# Patient Record
Sex: MALE | Race: WHITE | NOT HISPANIC OR LATINO | Employment: FULL TIME | URBAN - METROPOLITAN AREA
[De-identification: names, ages, dates, MRNs, and addresses within clinical notes are randomized per-mention and may not be internally consistent; named-entity substitution may affect disease eponyms.]

---

## 2023-02-27 ENCOUNTER — TRANSFERRED RECORDS (OUTPATIENT)
Dept: HEALTH INFORMATION MANAGEMENT | Facility: CLINIC | Age: 58
End: 2023-02-27

## 2023-02-28 ENCOUNTER — TRANSFERRED RECORDS (OUTPATIENT)
Dept: HEALTH INFORMATION MANAGEMENT | Facility: CLINIC | Age: 58
End: 2023-02-28

## 2023-03-02 ENCOUNTER — TRANSFERRED RECORDS (OUTPATIENT)
Dept: HEALTH INFORMATION MANAGEMENT | Facility: CLINIC | Age: 58
End: 2023-03-02

## 2023-03-02 ENCOUNTER — MEDICAL CORRESPONDENCE (OUTPATIENT)
Dept: HEALTH INFORMATION MANAGEMENT | Facility: CLINIC | Age: 58
End: 2023-03-02

## 2023-03-03 ENCOUNTER — MEDICAL CORRESPONDENCE (OUTPATIENT)
Dept: HEALTH INFORMATION MANAGEMENT | Facility: CLINIC | Age: 58
End: 2023-03-03

## 2023-03-03 ENCOUNTER — TRANSCRIBE ORDERS (OUTPATIENT)
Dept: OTHER | Age: 58
End: 2023-03-03

## 2023-03-03 ENCOUNTER — TELEPHONE (OUTPATIENT)
Dept: GASTROENTEROLOGY | Facility: CLINIC | Age: 58
End: 2023-03-03
Payer: COMMERCIAL

## 2023-03-03 ENCOUNTER — PATIENT OUTREACH (OUTPATIENT)
Dept: GASTROENTEROLOGY | Facility: CLINIC | Age: 58
End: 2023-03-03

## 2023-03-03 DIAGNOSIS — C25.0 ADENOCARCINOMA OF HEAD OF PANCREAS (H): Primary | ICD-10-CM

## 2023-03-03 NOTE — TELEPHONE ENCOUNTER
"Advanced Endoscopy     Referring provider: Dr Hayden Gallardo @ [facility]  Phone: [phone], Fax: [phone]    Referred to: Advanced Endoscopy Provider Group     Provider Requested: none specified     Referral Received: 3/3/23     Records received: CE    Labs 3/2/23  Total Bili 8.8  AST 71    Alk Phos 233    ERCP 3/2/23    Impression:          - ampulla with no bile flow         distal CBD was selectively cannulated with a sphincterotome preloaded with an         0.035\" guidewire, but guidewire could not be further advanced         distal CBD was selectively cannulated with a sphincterotome preloaded with an         0.025\" guidewire, but guidewire could not be further advanced even with some         contrast injection that did not inject into more proximal bile ducts         pre-cut sphincterotomy was performed in usual and guidewire was advanced only             into proximal CBD and could not be advanced further with guidewire rebounding             from proximal CBD obstruction         distal CBD was selectively cannulated with a sphincterotome preloaded with an         0.025\" guidewire, but again guidewire could not be further advanced     Recommendation:          - 1) Indomethacin 50 mg two suppositories OH for PEP prophylaxis         2) avoid NSAIDs (Advil, Motrin, Aleve, Naprosyn, Mobic, etc.) for 5 days         3) monitor for PEP         4) monitor LFTs         5) f/u Medical Oncology         6) referral to tertiary center for additional biliary drainage procedures         7) obtain CT pancreatic tumor protocol with IV contrast for further evaluation         8) f/u pathology results - pending    EGD/ERCP 2/28/23  Impression:          - distal CBD was selectively cannulated with a sphinctertome preloaded with an             0.035\" guidewire, but guidewire could not be further advanced         distal CBD was selectively cannulated with a sphinctertome preloaded with an         0.025\" guidewire, however " guidewire was only advanced into proximal CBD and         could not be advanced further with guidewire rebounding from proximal CBD         obstruction     FINAL DIAGNOSIS     Pancreas, endoscopic ultrasound-guided fine-needle aspirate:               - Positive for malignancy, adenocarcinoma.     MRCP 2/28/23  IMPRESSION:   1.  Ill-defined and malignant-appearing mass in the head of the pancreas measuring potentially up to 4.6 cm in diameter. This lesion results in abrupt obstruction of the common bile duct as well as pancreatic duct and is strongly concerning for primary pancreatic neoplasm. The relationship of this lesion to the surrounding vascular structures is not well-defined on this noncontrast study. Recommend multiphasic contrast-enhanced CT of the abdomen using the pancreatic protocol for more accurate characterization and staging of this lesion.     2.  This lesion results in obstruction of the biliary system with an abrupt cut off the common bile duct and mild diffuse intrahepatic biliary dilatation.     3.  Gallbladder is also distended and contains some stone and/or sludge material.       Images received: records dept obtaining    Evaluation for: Adenocarcinoma of head of pancreas (H)possible resection of pancreatic adenocarcinoma (confirmed by [facility] from EHS done this wek) possible biliary drainage w/ ERCP or other procedures     Clinical History (per RN review):     Hospital admission 2/27-3/2/23  57-year-old presented with painless jaundice, significant weight loss  CT describing an ill-defined of up to 4.6 cm  Concern for pancreatic malignancy Causing obstructive icterus.  ERCP done on 28 February and 2 March, Dr. Gallardo could not get through the obstruction  Refer to tertiary center for repeat attempt    MD review date:   MD Decision for clinic consultation/Orders:            Referral updates/Patient contacted:

## 2023-03-06 ENCOUNTER — PREP FOR PROCEDURE (OUTPATIENT)
Dept: GASTROENTEROLOGY | Facility: CLINIC | Age: 58
End: 2023-03-06
Payer: COMMERCIAL

## 2023-03-06 ENCOUNTER — PRE VISIT (OUTPATIENT)
Dept: ONCOLOGY | Facility: CLINIC | Age: 58
End: 2023-03-06
Payer: COMMERCIAL

## 2023-03-06 ENCOUNTER — PATIENT OUTREACH (OUTPATIENT)
Dept: ONCOLOGY | Facility: CLINIC | Age: 58
End: 2023-03-06
Payer: COMMERCIAL

## 2023-03-06 DIAGNOSIS — K83.1 BILIARY OBSTRUCTION (H): Primary | ICD-10-CM

## 2023-03-06 DIAGNOSIS — C25.9 PANCREAS CANCER (H): Primary | ICD-10-CM

## 2023-03-06 NOTE — PROGRESS NOTES
New Patient Oncology Nurse Navigator Note     Referring provider: Dr. Gallardo     Referring Clinic/Organization: [facility]     Referred to: Surgical Oncology -  Hepatobiliary / GI Cancers     Requested provider (if applicable): First available provider    Referral Received: 03/06/23       Evaluation for : Pancreas Cancer      Clinical History (per Nurse review of records provided):      See book marked documents:       Referring MD office note    Pathology report    Imaging reports     Procedure reports     Clinical Assessment / Barriers to Care (Per Nurse):    None at this time.     Records Location:     [facility] Everywhere     Records Needed:     ABDOMINAL IMAGING (CT SCANS, MRI, US, PET SCANS)  DATING BACK TO 2018      Additional testing needed prior to consult:     CT CAP   EUS/ERCP  LABS    Referral updates and Plan:       Consult with Surgical Oncology   CT CAP - pancreas protocol  Labs   EUS/ERCP        Natty Uribe RN, BSN   Surgical Oncology New Patient Nurse Navigator  [facility]  [phone]

## 2023-03-06 NOTE — PROGRESS NOTES
RECORDS STATUS - ALL OTHER DIAGNOSIS    Adenocarcinoma of head of pancreas (H)  RECORDS RECEIVED FROM: Epic/ [facility]    DATE RECEIVED: TBD    Action    Action Taken 3/6/2023 3pm KEELIE   I called [facility]- they will push imaging to [facility] PACS    3/7/2023 9:56AM BRYAN   I resolved imaging in PACS    3/8/2023 9:16AM BRYAN Vale said path slides are not needed.       NOTES STATUS DETAILS   OFFICE NOTE from referring provider     OFFICE NOTE from medical oncologist Complete [facility] Records are in CE   DISCHARGE SUMMARY from hospital     DISCHARGE REPORT from the ER     OPERATIVE REPORT Complete See biopsy in CE   CLINICAL TRIAL TREATMENTS TO DATE     LABS     PATHOLOGY REPORTS External biopsy - [facility]    Per BRY Vale's in-basket on 3/8/2023 path slides aren't needed  2/28/2023 [facility] Biopsy   Case: [id number]  Pancreas, endoscopic ultrasound-guided fine-needle aspirate:               - Positive for malignancy, adenocarcinoma.   ANYTHING RELATED TO DIAGNOSIS COmplete Labs last updated on 3/6/2023   GENONOMIC TESTING     TYPE:     IMAGING (NEED IMAGES & REPORT)     CT SCANS Complete - [facility]  CT Abdomen 1/24/2022   Xray Abdomen  Complete- [facility]  Xray Abdomen 1/24/2022   MRI Complete - [facility]  MRI Abdomen 2/28/2023   MAMMO     ULTRASOUND Complete- [facility] US Abdomen Complete 1/26/2022   PET

## 2023-03-06 NOTE — TELEPHONE ENCOUNTER
Per Dr. Abel Rizo for ERCP    Hospital admission 2/27-3/2/23  57-year-old presented with painless jaundice, significant weight loss  CT describing an ill-defined of up to 4.6 cm  Concern for pancreatic malignancy Causing obstructive icterus.  ERCP done on 28 February and 2 March, Dr. Gallardo could not get through the obstruction  Refer to tertiary center for repeat attempt    Specimen:  Fine Needle Aspiration - Pancreatic structure (body structure)  Component 6 d ago   Clinical History  Pancreatic mass    FINAL DIAGNOSIS     Pancreas, endoscopic ultrasound-guided fine-needle aspirate:               - Positive for malignancy, adenocarcinoma.       Patient is 6.5 hr away, wants to consolidate cares if needed. Message sent to surg onc, pt will follow locally with med oncology.     ML

## 2023-03-07 ENCOUNTER — PATIENT OUTREACH (OUTPATIENT)
Dept: GASTROENTEROLOGY | Facility: CLINIC | Age: 58
End: 2023-03-07
Payer: COMMERCIAL

## 2023-03-07 NOTE — TELEPHONE ENCOUNTER
Called patient to discuss plan for ERCP with Dr. Roman on 3/14, probable oncology visit prior.   Left message    ML

## 2023-03-08 NOTE — TELEPHONE ENCOUNTER
Called patient to confirm plan  For oncology visits on Monday, ERCP Tuesday, patient knows plan.    Odilia Acharya, RN Care Coordinator

## 2023-03-09 ENCOUNTER — PATIENT OUTREACH (OUTPATIENT)
Dept: GASTROENTEROLOGY | Facility: CLINIC | Age: 58
End: 2023-03-09
Payer: COMMERCIAL

## 2023-03-13 ENCOUNTER — PATIENT OUTREACH (OUTPATIENT)
Dept: GASTROENTEROLOGY | Facility: CLINIC | Age: 58
End: 2023-03-13

## 2023-03-13 RX ORDER — CHOLESTYRAMINE 4 G/9G
4 POWDER, FOR SUSPENSION ORAL 2 TIMES DAILY
COMMUNITY
Start: 2023-02-27 | End: 2023-03-20

## 2023-03-13 RX ORDER — SILDENAFIL CITRATE 20 MG/1
20 TABLET ORAL
COMMUNITY

## 2023-03-13 RX ORDER — METOPROLOL SUCCINATE 25 MG/1
1 TABLET, EXTENDED RELEASE ORAL DAILY
COMMUNITY
Start: 2023-02-28

## 2023-03-13 RX ORDER — IBUPROFEN 200 MG
800 TABLET ORAL EVERY 6 HOURS PRN
COMMUNITY
End: 2023-03-20

## 2023-03-13 RX ORDER — SEMAGLUTIDE 1.34 MG/ML
0.5 INJECTION, SOLUTION SUBCUTANEOUS WEEKLY
COMMUNITY
Start: 2023-02-23

## 2023-03-13 RX ORDER — ALLOPURINOL 100 MG/1
1 TABLET ORAL DAILY
COMMUNITY
Start: 2023-02-16 | End: 2024-02-16

## 2023-03-13 RX ORDER — GLIPIZIDE 10 MG/1
10 TABLET ORAL
COMMUNITY
Start: 2022-11-15 | End: 2023-11-15

## 2023-03-13 RX ORDER — OXYCODONE HYDROCHLORIDE 5 MG/1
5-10 TABLET ORAL EVERY 6 HOURS PRN
COMMUNITY
Start: 2023-03-06 | End: 2023-03-14

## 2023-03-13 RX ORDER — AMLODIPINE BESYLATE 5 MG/1
5 TABLET ORAL DAILY
COMMUNITY
Start: 2023-03-03 | End: 2023-03-20

## 2023-03-13 RX ORDER — HYDROXYZINE HYDROCHLORIDE 10 MG/1
10 TABLET, FILM COATED ORAL 3 TIMES DAILY PRN
COMMUNITY

## 2023-03-13 NOTE — TELEPHONE ENCOUNTER
Called patient to confirm plan for procedure scheduled tomorrow, patient confirmed they will arrive for procedure tomorrow, discussed NPO guidelines, questions answered.  ML   Encounter Date: 5/4/2017    SCRIBE #1 NOTE: I, Raven Oliva, am scribing for, and in the presence of, Dr. Ribera.       History     Chief Complaint   Patient presents with    Back Pain     Review of patient's allergies indicates:   Allergen Reactions    Ceclor [cefaclor] Hives     HPI Comments: Time seen by provider: 9:14 PM    This is a 41 y.o. male with history of herniated lumbar disc, DM, s/p appendectomy who presents with complaint of acute onset of low back pain. Symptoms started this morning after he coughed. Reports history of sciatica causing low back pain. He has received epidural steroid injections for the pain in the past. He has been using CBD oil for treatment with success until today. Last episode of severe pain was 3 year ago. Also complains of mild chest tightness at night and intermittent productive cough. He denies fever, chills, urinary symptoms, hematuria, weakness of the extremities. He takes insulin injections and denies redness surrounding the injection site. States that this blood sugars have been good recently.     The history is provided by the patient.     Past Medical History:   Diagnosis Date    Diabetes mellitus type II     Fever blister     Herniated lumbar intervertebral disc 2011     Past Surgical History:   Procedure Laterality Date    APPENDECTOMY       Family History   Problem Relation Age of Onset    Diabetes Mother     Hypertension Father     Diabetes Maternal Aunt     Diabetes Maternal Grandmother      Social History   Substance Use Topics    Smoking status: Current Every Day Smoker     Packs/day: 1.00    Smokeless tobacco: None    Alcohol use No     Review of Systems   Constitutional: Negative for chills and fever.   Respiratory: Positive for cough and chest tightness.    Genitourinary: Negative for difficulty urinating, enuresis, frequency and hematuria.   Musculoskeletal: Positive for back pain.   Skin: Negative for color change.   Neurological: Negative for  weakness.       Physical Exam   Initial Vitals   BP Pulse Resp Temp SpO2   05/04/17 1839 05/04/17 1839 05/04/17 1839 05/04/17 1839 05/04/17 1839   130/72 68 16 98.1 °F (36.7 °C) 98 %     Physical Exam    Nursing note and vitals reviewed.  Constitutional: No distress.   Neck: Normal range of motion. Neck supple.   Cardiovascular: Normal rate, regular rhythm and normal heart sounds.   No murmur heard.  Normal peripheral vascular with normal pedal pulses.   Pulmonary/Chest: Breath sounds normal. No respiratory distress. He has no wheezes. He has no rhonchi. He has no rales.   Abdominal: Soft. He exhibits no distension. There is no tenderness.   Musculoskeletal:        Lumbar back: He exhibits tenderness.   Lumbar spine: Mild tenderness at L5/S1 area. No evidence of deformity, erythema, or infection. Straight leg raise reproduces pain bilaterally.    Neurological: He is alert and oriented to person, place, and time.   Lower extremity strength is preserved.   No significant sensory deficits.  No focal deficits.         ED Course   Procedures  Labs Reviewed - No data to display          Medical Decision Making:   History:   Old Medical Records: I decided to obtain old medical records.  ED Management:  Patient with history of recurrent sciatica presents for a recent flare brought on by coughing.  He has no red flags suggestive of cauda equina syndrome or acute discitis.  He is given prescriptions.  Prescriptions for an oral opiates, and muscle relaxers for symptom control and is encouraged to follow-up with the orthopedic spine center if he finds an increase in acute flares.  We discussed a short course of steroids, but decided to defer this and favor more conservative treatment to the patient's history of type 1 diabetes            Scribe Attestation:   Scribe #1: I performed the above scribed service and the documentation accurately describes the services I performed. I attest to the accuracy of the note.    Attending  Attestation:           Physician Attestation for Scribe:  Physician Attestation Statement for Scribe #1: I, Dr. Ribera, reviewed documentation, as scribed by Raven Oliva in my presence, and it is both accurate and complete.                 ED Course     Clinical Impression:   The encounter diagnosis was Acute left-sided low back pain with left-sided sciatica.    Disposition:   Disposition: Discharged  Condition: Stable       Giuseppe Ribera MD  05/05/17 0019

## 2023-03-14 ENCOUNTER — HOSPITAL ENCOUNTER (OUTPATIENT)
Dept: CT IMAGING | Facility: CLINIC | Age: 58
Discharge: HOME | End: 2023-03-14
Attending: SURGERY | Admitting: INTERNAL MEDICINE
Payer: COMMERCIAL

## 2023-03-14 ENCOUNTER — ANESTHESIA EVENT (OUTPATIENT)
Dept: SURGERY | Facility: CLINIC | Age: 58
End: 2023-03-14
Payer: COMMERCIAL

## 2023-03-14 ENCOUNTER — ANESTHESIA (OUTPATIENT)
Dept: SURGERY | Facility: CLINIC | Age: 58
End: 2023-03-14
Payer: COMMERCIAL

## 2023-03-14 ENCOUNTER — HOSPITAL ENCOUNTER (OUTPATIENT)
Facility: CLINIC | Age: 58
Discharge: HOME | End: 2023-03-14
Attending: INTERNAL MEDICINE | Admitting: INTERNAL MEDICINE
Payer: COMMERCIAL

## 2023-03-14 ENCOUNTER — APPOINTMENT (OUTPATIENT)
Dept: GENERAL RADIOLOGY | Facility: CLINIC | Age: 58
End: 2023-03-14
Attending: INTERNAL MEDICINE
Payer: COMMERCIAL

## 2023-03-14 VITALS
BODY MASS INDEX: 31.42 KG/M2 | OXYGEN SATURATION: 100 % | SYSTOLIC BLOOD PRESSURE: 159 MMHG | TEMPERATURE: 98.6 F | DIASTOLIC BLOOD PRESSURE: 81 MMHG | RESPIRATION RATE: 16 BRPM | HEART RATE: 79 BPM | HEIGHT: 71 IN | WEIGHT: 224.43 LBS

## 2023-03-14 DIAGNOSIS — G89.18 ACUTE POST-OPERATIVE PAIN: Primary | ICD-10-CM

## 2023-03-14 DIAGNOSIS — C25.9 PANCREAS CANCER (H): ICD-10-CM

## 2023-03-14 LAB
ALBUMIN SERPL BCG-MCNC: 4.2 G/DL (ref 3.5–5.2)
ALP SERPL-CCNC: 390 U/L (ref 40–129)
ALT SERPL W P-5'-P-CCNC: 132 U/L (ref 10–50)
AMYLASE SERPL-CCNC: 44 U/L (ref 28–100)
ANION GAP SERPL CALCULATED.3IONS-SCNC: 13 MMOL/L (ref 7–15)
AST SERPL W P-5'-P-CCNC: 74 U/L (ref 10–50)
BILIRUB SERPL-MCNC: 14.7 MG/DL
BUN SERPL-MCNC: 15.8 MG/DL (ref 6–20)
CALCIUM SERPL-MCNC: 9.8 MG/DL (ref 8.6–10)
CHLORIDE SERPL-SCNC: 101 MMOL/L (ref 98–107)
CREAT SERPL-MCNC: 0.77 MG/DL (ref 0.67–1.17)
DEPRECATED HCO3 PLAS-SCNC: 21 MMOL/L (ref 22–29)
ERYTHROCYTE [DISTWIDTH] IN BLOOD BY AUTOMATED COUNT: 14.3 % (ref 10–15)
GFR SERPL CREATININE-BSD FRML MDRD: >90 ML/MIN/1.73M2
GLUCOSE BLDC GLUCOMTR-MCNC: 269 MG/DL (ref 70–99)
GLUCOSE BLDC GLUCOMTR-MCNC: 292 MG/DL (ref 70–99)
GLUCOSE BLDC GLUCOMTR-MCNC: 332 MG/DL (ref 70–99)
GLUCOSE BLDC GLUCOMTR-MCNC: 364 MG/DL (ref 70–99)
GLUCOSE SERPL-MCNC: 288 MG/DL (ref 70–99)
HBA1C MFR BLD: 8.1 %
HCT VFR BLD AUTO: 43 % (ref 40–53)
HGB BLD-MCNC: 13.8 G/DL (ref 13.3–17.7)
INR PPP: 1.36 (ref 0.85–1.15)
LIPASE SERPL-CCNC: 102 U/L (ref 13–60)
MCH RBC QN AUTO: 29.4 PG (ref 26.5–33)
MCHC RBC AUTO-ENTMCNC: 32.1 G/DL (ref 31.5–36.5)
MCV RBC AUTO: 92 FL (ref 78–100)
PLATELET # BLD AUTO: 252 10E3/UL (ref 150–450)
POTASSIUM SERPL-SCNC: 4.3 MMOL/L (ref 3.4–5.3)
PROT SERPL-MCNC: 7.6 G/DL (ref 6.4–8.3)
RBC # BLD AUTO: 4.69 10E6/UL (ref 4.4–5.9)
SODIUM SERPL-SCNC: 135 MMOL/L (ref 136–145)
WBC # BLD AUTO: 6.7 10E3/UL (ref 4–11)

## 2023-03-14 PROCEDURE — 258N000003 HC RX IP 258 OP 636: Performed by: NURSE ANESTHETIST, CERTIFIED REGISTERED

## 2023-03-14 PROCEDURE — 999N000181 XR SURGERY CARM FLUORO GREATER THAN 5 MIN W STILLS: Mod: TC

## 2023-03-14 PROCEDURE — 250N000011 HC RX IP 250 OP 636: Performed by: ANESTHESIOLOGY

## 2023-03-14 PROCEDURE — 999N000141 HC STATISTIC PRE-PROCEDURE NURSING ASSESSMENT: Performed by: INTERNAL MEDICINE

## 2023-03-14 PROCEDURE — C1769 GUIDE WIRE: HCPCS | Performed by: INTERNAL MEDICINE

## 2023-03-14 PROCEDURE — 36415 COLL VENOUS BLD VENIPUNCTURE: CPT | Performed by: INTERNAL MEDICINE

## 2023-03-14 PROCEDURE — 272N000001 HC OR GENERAL SUPPLY STERILE: Performed by: INTERNAL MEDICINE

## 2023-03-14 PROCEDURE — 85027 COMPLETE CBC AUTOMATED: CPT | Performed by: INTERNAL MEDICINE

## 2023-03-14 PROCEDURE — 250N000013 HC RX MED GY IP 250 OP 250 PS 637: Performed by: INTERNAL MEDICINE

## 2023-03-14 PROCEDURE — 74178 CT ABD&PLV WO CNTR FLWD CNTR: CPT | Mod: 26 | Performed by: RADIOLOGY

## 2023-03-14 PROCEDURE — 710N000009 HC RECOVERY PHASE 1, LEVEL 1, PER MIN: Performed by: INTERNAL MEDICINE

## 2023-03-14 PROCEDURE — 85610 PROTHROMBIN TIME: CPT | Performed by: INTERNAL MEDICINE

## 2023-03-14 PROCEDURE — 71260 CT THORAX DX C+: CPT

## 2023-03-14 PROCEDURE — 82150 ASSAY OF AMYLASE: CPT | Performed by: INTERNAL MEDICINE

## 2023-03-14 PROCEDURE — 250N000011 HC RX IP 250 OP 636: Performed by: INTERNAL MEDICINE

## 2023-03-14 PROCEDURE — 250N000011 HC RX IP 250 OP 636: Performed by: SURGERY

## 2023-03-14 PROCEDURE — 82962 GLUCOSE BLOOD TEST: CPT | Mod: 91

## 2023-03-14 PROCEDURE — 250N000011 HC RX IP 250 OP 636: Performed by: STUDENT IN AN ORGANIZED HEALTH CARE EDUCATION/TRAINING PROGRAM

## 2023-03-14 PROCEDURE — 71260 CT THORAX DX C+: CPT | Mod: 26 | Performed by: RADIOLOGY

## 2023-03-14 PROCEDURE — 360N000082 HC SURGERY LEVEL 2 W/ FLUORO, PER MIN: Performed by: INTERNAL MEDICINE

## 2023-03-14 PROCEDURE — 255N000002 HC RX 255 OP 636: Performed by: INTERNAL MEDICINE

## 2023-03-14 PROCEDURE — 250N000011 HC RX IP 250 OP 636

## 2023-03-14 PROCEDURE — 370N000017 HC ANESTHESIA TECHNICAL FEE, PER MIN: Performed by: INTERNAL MEDICINE

## 2023-03-14 PROCEDURE — 250N000009 HC RX 250: Performed by: NURSE ANESTHETIST, CERTIFIED REGISTERED

## 2023-03-14 PROCEDURE — 250N000011 HC RX IP 250 OP 636: Performed by: NURSE ANESTHETIST, CERTIFIED REGISTERED

## 2023-03-14 PROCEDURE — 250N000009 HC RX 250: Performed by: INTERNAL MEDICINE

## 2023-03-14 PROCEDURE — 258N000003 HC RX IP 258 OP 636: Performed by: INTERNAL MEDICINE

## 2023-03-14 PROCEDURE — 710N000012 HC RECOVERY PHASE 2, PER MINUTE: Performed by: INTERNAL MEDICINE

## 2023-03-14 PROCEDURE — 74178 CT ABD&PLV WO CNTR FLWD CNTR: CPT

## 2023-03-14 PROCEDURE — C1876 STENT, NON-COA/NON-COV W/DEL: HCPCS | Performed by: INTERNAL MEDICINE

## 2023-03-14 PROCEDURE — 36415 COLL VENOUS BLD VENIPUNCTURE: CPT | Performed by: ANESTHESIOLOGY

## 2023-03-14 PROCEDURE — 250N000009 HC RX 250: Performed by: ANESTHESIOLOGY

## 2023-03-14 PROCEDURE — 83036 HEMOGLOBIN GLYCOSYLATED A1C: CPT | Performed by: ANESTHESIOLOGY

## 2023-03-14 PROCEDURE — 250N000025 HC SEVOFLURANE, PER MIN: Performed by: INTERNAL MEDICINE

## 2023-03-14 PROCEDURE — 80053 COMPREHEN METABOLIC PANEL: CPT | Performed by: INTERNAL MEDICINE

## 2023-03-14 PROCEDURE — 83690 ASSAY OF LIPASE: CPT | Performed by: INTERNAL MEDICINE

## 2023-03-14 DEVICE — STENT BILIARY LUMINEXX 10X80MMX190CM: Type: IMPLANTABLE DEVICE | Site: BILE DUCT | Status: FUNCTIONAL

## 2023-03-14 RX ORDER — INDOMETHACIN 50 MG/1
SUPPOSITORY RECTAL PRN
Status: DISCONTINUED | OUTPATIENT
Start: 2023-03-14 | End: 2023-03-14 | Stop reason: HOSPADM

## 2023-03-14 RX ORDER — LIDOCAINE 40 MG/G
CREAM TOPICAL
Status: DISCONTINUED | OUTPATIENT
Start: 2023-03-14 | End: 2023-03-14 | Stop reason: HOSPADM

## 2023-03-14 RX ORDER — PROPOFOL 10 MG/ML
INJECTION, EMULSION INTRAVENOUS PRN
Status: DISCONTINUED | OUTPATIENT
Start: 2023-03-14 | End: 2023-03-14

## 2023-03-14 RX ORDER — LIDOCAINE HYDROCHLORIDE 20 MG/ML
INJECTION, SOLUTION INFILTRATION; PERINEURAL PRN
Status: DISCONTINUED | OUTPATIENT
Start: 2023-03-14 | End: 2023-03-14

## 2023-03-14 RX ORDER — IOPAMIDOL 510 MG/ML
INJECTION, SOLUTION INTRAVASCULAR PRN
Status: DISCONTINUED | OUTPATIENT
Start: 2023-03-14 | End: 2023-03-14 | Stop reason: HOSPADM

## 2023-03-14 RX ORDER — NALOXONE HYDROCHLORIDE 0.4 MG/ML
0.4 INJECTION, SOLUTION INTRAMUSCULAR; INTRAVENOUS; SUBCUTANEOUS
Status: DISCONTINUED | OUTPATIENT
Start: 2023-03-14 | End: 2023-03-14 | Stop reason: HOSPADM

## 2023-03-14 RX ORDER — ONDANSETRON 2 MG/ML
4 INJECTION INTRAMUSCULAR; INTRAVENOUS EVERY 30 MIN PRN
Status: DISCONTINUED | OUTPATIENT
Start: 2023-03-14 | End: 2023-03-14 | Stop reason: HOSPADM

## 2023-03-14 RX ORDER — METOPROLOL TARTRATE 1 MG/ML
1-2 INJECTION, SOLUTION INTRAVENOUS EVERY 5 MIN PRN
Status: DISCONTINUED | OUTPATIENT
Start: 2023-03-14 | End: 2023-03-14 | Stop reason: HOSPADM

## 2023-03-14 RX ORDER — ESMOLOL HYDROCHLORIDE 10 MG/ML
INJECTION INTRAVENOUS PRN
Status: DISCONTINUED | OUTPATIENT
Start: 2023-03-14 | End: 2023-03-14

## 2023-03-14 RX ORDER — CYCLOBENZAPRINE HCL 5 MG
TABLET ORAL
COMMUNITY
Start: 2023-01-24 | End: 2023-03-20

## 2023-03-14 RX ORDER — SODIUM CHLORIDE, SODIUM LACTATE, POTASSIUM CHLORIDE, CALCIUM CHLORIDE 600; 310; 30; 20 MG/100ML; MG/100ML; MG/100ML; MG/100ML
INJECTION, SOLUTION INTRAVENOUS CONTINUOUS
Status: DISCONTINUED | OUTPATIENT
Start: 2023-03-14 | End: 2023-03-14 | Stop reason: HOSPADM

## 2023-03-14 RX ORDER — OXYCODONE AND ACETAMINOPHEN 5; 325 MG/1; MG/1
1 TABLET ORAL EVERY 6 HOURS PRN
Qty: 12 TABLET | Refills: 0 | Status: SHIPPED | OUTPATIENT
Start: 2023-03-14 | End: 2023-03-17

## 2023-03-14 RX ORDER — NALOXONE HYDROCHLORIDE 0.4 MG/ML
0.2 INJECTION, SOLUTION INTRAMUSCULAR; INTRAVENOUS; SUBCUTANEOUS
Status: DISCONTINUED | OUTPATIENT
Start: 2023-03-14 | End: 2023-03-14 | Stop reason: HOSPADM

## 2023-03-14 RX ORDER — NICOTINE POLACRILEX 4 MG
15-30 LOZENGE BUCCAL
Status: DISCONTINUED | OUTPATIENT
Start: 2023-03-14 | End: 2023-03-14 | Stop reason: HOSPADM

## 2023-03-14 RX ORDER — SODIUM CHLORIDE, SODIUM LACTATE, POTASSIUM CHLORIDE, CALCIUM CHLORIDE 600; 310; 30; 20 MG/100ML; MG/100ML; MG/100ML; MG/100ML
INJECTION, SOLUTION INTRAVENOUS CONTINUOUS PRN
Status: DISCONTINUED | OUTPATIENT
Start: 2023-03-14 | End: 2023-03-14

## 2023-03-14 RX ORDER — ACETAMINOPHEN 500 MG
1000 TABLET ORAL PRN
COMMUNITY

## 2023-03-14 RX ORDER — ONDANSETRON 4 MG/1
4 TABLET, ORALLY DISINTEGRATING ORAL EVERY 6 HOURS PRN
Status: DISCONTINUED | OUTPATIENT
Start: 2023-03-14 | End: 2023-03-14 | Stop reason: HOSPADM

## 2023-03-14 RX ORDER — FENTANYL CITRATE 50 UG/ML
INJECTION, SOLUTION INTRAMUSCULAR; INTRAVENOUS PRN
Status: DISCONTINUED | OUTPATIENT
Start: 2023-03-14 | End: 2023-03-14

## 2023-03-14 RX ORDER — KETAMINE HYDROCHLORIDE 10 MG/ML
INJECTION INTRAMUSCULAR; INTRAVENOUS PRN
Status: DISCONTINUED | OUTPATIENT
Start: 2023-03-14 | End: 2023-03-14

## 2023-03-14 RX ORDER — HYDRALAZINE HYDROCHLORIDE 20 MG/ML
2.5-5 INJECTION INTRAMUSCULAR; INTRAVENOUS EVERY 10 MIN PRN
Status: DISCONTINUED | OUTPATIENT
Start: 2023-03-14 | End: 2023-03-14 | Stop reason: HOSPADM

## 2023-03-14 RX ORDER — FENTANYL CITRATE 50 UG/ML
25 INJECTION, SOLUTION INTRAMUSCULAR; INTRAVENOUS EVERY 5 MIN PRN
Status: DISCONTINUED | OUTPATIENT
Start: 2023-03-14 | End: 2023-03-14 | Stop reason: HOSPADM

## 2023-03-14 RX ORDER — DEXTROSE MONOHYDRATE 100 MG/ML
INJECTION, SOLUTION INTRAVENOUS CONTINUOUS PRN
Status: DISCONTINUED | OUTPATIENT
Start: 2023-03-14 | End: 2023-03-14 | Stop reason: HOSPADM

## 2023-03-14 RX ORDER — ONDANSETRON 2 MG/ML
4 INJECTION INTRAMUSCULAR; INTRAVENOUS EVERY 6 HOURS PRN
Status: DISCONTINUED | OUTPATIENT
Start: 2023-03-14 | End: 2023-03-14 | Stop reason: HOSPADM

## 2023-03-14 RX ORDER — DEXTROSE MONOHYDRATE 25 G/50ML
25-50 INJECTION, SOLUTION INTRAVENOUS
Status: DISCONTINUED | OUTPATIENT
Start: 2023-03-14 | End: 2023-03-14 | Stop reason: HOSPADM

## 2023-03-14 RX ORDER — EPINEPHRINE 1 MG/ML
INJECTION, SOLUTION, CONCENTRATE INTRAVENOUS PRN
Status: DISCONTINUED | OUTPATIENT
Start: 2023-03-14 | End: 2023-03-14 | Stop reason: HOSPADM

## 2023-03-14 RX ORDER — ONDANSETRON 2 MG/ML
INJECTION INTRAMUSCULAR; INTRAVENOUS PRN
Status: DISCONTINUED | OUTPATIENT
Start: 2023-03-14 | End: 2023-03-14

## 2023-03-14 RX ORDER — FLUMAZENIL 0.1 MG/ML
0.2 INJECTION, SOLUTION INTRAVENOUS
Status: DISCONTINUED | OUTPATIENT
Start: 2023-03-14 | End: 2023-03-14 | Stop reason: HOSPADM

## 2023-03-14 RX ORDER — AMLODIPINE BESYLATE 5 MG/1
5 TABLET ORAL ONCE
Status: COMPLETED | OUTPATIENT
Start: 2023-03-14 | End: 2023-03-14

## 2023-03-14 RX ORDER — IOPAMIDOL 755 MG/ML
135 INJECTION, SOLUTION INTRAVASCULAR ONCE
Status: COMPLETED | OUTPATIENT
Start: 2023-03-14 | End: 2023-03-14

## 2023-03-14 RX ORDER — LEVOFLOXACIN 5 MG/ML
INJECTION, SOLUTION INTRAVENOUS PRN
Status: DISCONTINUED | OUTPATIENT
Start: 2023-03-14 | End: 2023-03-14

## 2023-03-14 RX ORDER — ONDANSETRON 4 MG/1
4 TABLET, ORALLY DISINTEGRATING ORAL EVERY 30 MIN PRN
Status: DISCONTINUED | OUTPATIENT
Start: 2023-03-14 | End: 2023-03-14 | Stop reason: HOSPADM

## 2023-03-14 RX ORDER — HYDRALAZINE HYDROCHLORIDE 20 MG/ML
10 INJECTION INTRAMUSCULAR; INTRAVENOUS ONCE
Status: COMPLETED | OUTPATIENT
Start: 2023-03-14 | End: 2023-03-14

## 2023-03-14 RX ORDER — DEXAMETHASONE SODIUM PHOSPHATE 4 MG/ML
INJECTION, SOLUTION INTRA-ARTICULAR; INTRALESIONAL; INTRAMUSCULAR; INTRAVENOUS; SOFT TISSUE PRN
Status: DISCONTINUED | OUTPATIENT
Start: 2023-03-14 | End: 2023-03-14

## 2023-03-14 RX ORDER — HYDROMORPHONE HYDROCHLORIDE 1 MG/ML
0.2 INJECTION, SOLUTION INTRAMUSCULAR; INTRAVENOUS; SUBCUTANEOUS EVERY 5 MIN PRN
Status: DISCONTINUED | OUTPATIENT
Start: 2023-03-14 | End: 2023-03-14 | Stop reason: HOSPADM

## 2023-03-14 RX ORDER — OXYCODONE HYDROCHLORIDE 5 MG/1
5 TABLET ORAL ONCE
Status: COMPLETED | OUTPATIENT
Start: 2023-03-14 | End: 2023-03-14

## 2023-03-14 RX ADMIN — LEVOFLOXACIN 500 MG: 5 INJECTION, SOLUTION INTRAVENOUS at 08:15

## 2023-03-14 RX ADMIN — PROPOFOL 100 MG: 10 INJECTION, EMULSION INTRAVENOUS at 08:10

## 2023-03-14 RX ADMIN — DEXAMETHASONE SODIUM PHOSPHATE 8 MG: 4 INJECTION, SOLUTION INTRA-ARTICULAR; INTRALESIONAL; INTRAMUSCULAR; INTRAVENOUS; SOFT TISSUE at 08:11

## 2023-03-14 RX ADMIN — Medication 40 MG: at 08:09

## 2023-03-14 RX ADMIN — ONDANSETRON 4 MG: 2 INJECTION INTRAMUSCULAR; INTRAVENOUS at 08:15

## 2023-03-14 RX ADMIN — AMLODIPINE BESYLATE 5 MG: 5 TABLET ORAL at 13:13

## 2023-03-14 RX ADMIN — PHENYLEPHRINE HYDROCHLORIDE 100 MCG: 10 INJECTION INTRAVENOUS at 08:25

## 2023-03-14 RX ADMIN — FENTANYL CITRATE 25 MCG: 50 INJECTION INTRAMUSCULAR; INTRAVENOUS at 09:42

## 2023-03-14 RX ADMIN — PROPOFOL 50 MG: 10 INJECTION, EMULSION INTRAVENOUS at 08:14

## 2023-03-14 RX ADMIN — FENTANYL CITRATE 25 MCG: 50 INJECTION INTRAMUSCULAR; INTRAVENOUS at 10:00

## 2023-03-14 RX ADMIN — OXYCODONE HYDROCHLORIDE 5 MG: 5 TABLET ORAL at 13:13

## 2023-03-14 RX ADMIN — PHENYLEPHRINE HYDROCHLORIDE 100 MCG: 10 INJECTION INTRAVENOUS at 08:31

## 2023-03-14 RX ADMIN — HYDRALAZINE HYDROCHLORIDE 10 MG: 20 INJECTION INTRAMUSCULAR; INTRAVENOUS at 10:58

## 2023-03-14 RX ADMIN — HYDRALAZINE HYDROCHLORIDE 5 MG: 20 INJECTION INTRAMUSCULAR; INTRAVENOUS at 10:03

## 2023-03-14 RX ADMIN — METOPROLOL TARTRATE 2 MG: 5 INJECTION INTRAVENOUS at 10:43

## 2023-03-14 RX ADMIN — FENTANYL CITRATE 25 MCG: 50 INJECTION INTRAMUSCULAR; INTRAVENOUS at 10:06

## 2023-03-14 RX ADMIN — METOPROLOL TARTRATE 2 MG: 5 INJECTION INTRAVENOUS at 10:29

## 2023-03-14 RX ADMIN — LIDOCAINE HYDROCHLORIDE 100 MG: 20 INJECTION, SOLUTION INFILTRATION; PERINEURAL at 08:08

## 2023-03-14 RX ADMIN — Medication 10 MG: at 09:01

## 2023-03-14 RX ADMIN — HYDRALAZINE HYDROCHLORIDE 5 MG: 20 INJECTION INTRAMUSCULAR; INTRAVENOUS at 10:44

## 2023-03-14 RX ADMIN — FENTANYL CITRATE 25 MCG: 50 INJECTION INTRAMUSCULAR; INTRAVENOUS at 10:19

## 2023-03-14 RX ADMIN — SODIUM CHLORIDE, POTASSIUM CHLORIDE, SODIUM LACTATE AND CALCIUM CHLORIDE: 600; 310; 30; 20 INJECTION, SOLUTION INTRAVENOUS at 08:00

## 2023-03-14 RX ADMIN — SUGAMMADEX 200 MG: 100 INJECTION, SOLUTION INTRAVENOUS at 09:15

## 2023-03-14 RX ADMIN — FENTANYL CITRATE 25 MCG: 50 INJECTION INTRAMUSCULAR; INTRAVENOUS at 09:51

## 2023-03-14 RX ADMIN — PHYTONADIONE 2 MG: 10 INJECTION, EMULSION INTRAMUSCULAR; INTRAVENOUS; SUBCUTANEOUS at 08:40

## 2023-03-14 RX ADMIN — IOPAMIDOL 135 ML: 755 INJECTION, SOLUTION INTRAVENOUS at 11:28

## 2023-03-14 RX ADMIN — ESMOLOL HYDROCHLORIDE 30 MG: 10 INJECTION, SOLUTION INTRAVENOUS at 08:10

## 2023-03-14 RX ADMIN — PROPOFOL 50 MG: 10 INJECTION, EMULSION INTRAVENOUS at 08:12

## 2023-03-14 RX ADMIN — FENTANYL CITRATE 100 MCG: 50 INJECTION, SOLUTION INTRAMUSCULAR; INTRAVENOUS at 08:16

## 2023-03-14 RX ADMIN — Medication 50 MG: at 08:11

## 2023-03-14 RX ADMIN — ESMOLOL HYDROCHLORIDE 20 MG: 10 INJECTION, SOLUTION INTRAVENOUS at 08:14

## 2023-03-14 ASSESSMENT — ACTIVITIES OF DAILY LIVING (ADL)
ADLS_ACUITY_SCORE: 35

## 2023-03-14 NOTE — OR NURSING
Paged Dr. Guevara: MACRINA Lizarraga-  269, having same day ERCP, do you want to start insulin infusion order?- [place] [phone] ext. [phone]  No interventions ordered.

## 2023-03-14 NOTE — ANESTHESIA CARE TRANSFER NOTE
Patient: Slick Lizarraga    Procedure: Procedure(s):  ENDOSCOPIC RETROGRADE CHOLANGIOPANCREATOGRAPHY with biliary and  pancreatic spinchterotomy, biliary stent placement       Diagnosis: Biliary obstruction [K83.1]  Diagnosis Additional Information: No value filed.    Anesthesia Type:   General     Note:    Oropharynx: oropharynx clear of all foreign objects and spontaneously breathing  Level of Consciousness: drowsy  Oxygen Supplementation: nasal cannula  Level of Supplemental Oxygen (L/min / FiO2): 3  Independent Airway: airway patency satisfactory and stable  Dentition: dentition unchanged  Vital Signs Stable: post-procedure vital signs reviewed and stable  Report to RN Given: handoff report given  Patient transferred to: PACU    Handoff Report: Identifed the Patient, Identified the Reponsible Provider, Reviewed the pertinent medical history, Discussed the surgical course, Reviewed Intra-OP anesthesia mangement and issues during anesthesia, Set expectations for post-procedure period and Allowed opportunity for questions and acknowledgement of understanding      Vitals:  Vitals Value Taken Time   BP     Temp     Pulse     Resp     SpO2         Electronically Signed By: PATRICIA Espino CRNA  March 14, 2023  9:30 AM

## 2023-03-14 NOTE — OR NURSING
Dr. Friend at bedside.    Patient`s SBP in PreOp was 142. -180 throughout PACU with despite PRNs (see MAR).  on arrival to phase II. Patient states that he took his home dose of Metoprolol this morning, but did not bring his blood pressure medications with him and is staying overnight in a hotel nearby. Will give Norvasc 5 mg  PO prior to discharge.     Patient also requesting a one-time dose of PO pain medication to take prior to discharge for pain 8.5/10 to right shoulder blade.     Will continue to monitor.

## 2023-03-14 NOTE — DISCHARGE INSTRUCTIONS
[facility]  Same-Day Surgery   Adult Discharge Orders & Instructions     For 24 hours after surgery    Get plenty of rest.  A responsible adult must stay with you for at least 24 hours after you leave the hospital.   Do not drive or use heavy equipment.  If you have weakness or tingling, don't drive or use heavy equipment until this feeling goes away.  Do not drink alcohol.  Avoid strenuous or risky activities.  Ask for help when climbing stairs.   You may feel lightheaded.  IF so, sit for a few minutes before standing.  Have someone help you get up.   If you have nausea (feel sick to your stomach): Drink only clear liquids such as apple juice, ginger ale, broth or 7-Up.  Rest may also help.  Be sure to drink enough fluids.  Move to a regular diet as you feel able.  You may have a slight fever. Call the doctor if your fever is over 100 F (37.7 C) (taken under the tongue) or lasts longer than 24 hours.  You may have a dry mouth, a sore throat, muscle aches or trouble sleeping.  These should go away after 24 hours.  Do not make important or legal decisions.   Call your doctor for any of the followin.  Signs of infection (fever, growing tenderness at the surgery site, a large amount of drainage or bleeding, severe pain, foul-smelling drainage, redness, swelling).    2. It has been over 8 to 10 hours since surgery and you are still not able to urinate (pass water).    3.  Headache for over 24 hours.    To contact a doctor, call Dr Roman's clinic at [phone] or:    '   [phone] and ask for the resident on call for Gastroenterology (answered 24 hours a day)  '   Emergency Department:    [facility]: [phone]       (TTY for hearing impaired: [phone])

## 2023-03-14 NOTE — BRIEF OP NOTE
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 He underwent an ERCP at [facility] in which only the distal common bile duct was cannulated and wire failed to advance to the proximal biliary duct. On MRCP, the common bile duct measured 8 mm above the level of pancreatic mass and pancreatic duct measured 7 mm distal to the pancreatic head mass. He is referred for repeat ERCP for biliary decompression.     Pre-operative diagnosis: Biliary obstruction [K83.1]  Post-operative diagnosis Same as pre-operative diagnosis    Procedure: Procedure(s):  ENDOSCOPIC RETROGRADE CHOLANGIOPANCREATOGRAPHY with biliary and  pancreatic spinchterotomy, biliary stent placement  Surgeon: Surgeon(s) and Role:     * Ray Roman MD - Primary     * Naveed Friend MD - Fellow - Assisting  Anesthesia: General   Estimated Blood Loss: Minimal    Drains: None  Specimens: * No specimens in log *  Findings:      - The ventral pancreatic duct was accessed but abruptly terminates in the head of the pancreas due to the obstructive pancreatic head tumor    - A pancreatic precut sphincterotomy was performed to access the biliary duct. The common bile duct was then successfully cannulated  - A single diffuse biliary stricture due to external compression from pancreatic head tumor was found in the middle third of the main bile duct.   - A 10 mm x 8 cm uncovered Flexus metal stent was placed into the common bile duct across the stricture. Large amount of dark bile was seen following through the stent    Complications: None.  Implants:   Implant Name Type Inv. Item Serial No.  Lot No. LRB No. Used Action   STENT BILIARY LUMINEXX [id number] - [id number] Stent STENT BILIARY LUMINEXX [id number]  Molecule Synth [id number] N/A 1 Implanted     Recommendations  - Observe  patient in same day observation unit for ongoing care with plans for discharge to home later today   - Patient will get a CT chest/Abdomen for staging today  - Follow up with the oncology clinic  - The findings and recommendations were discussed with the patient and their family

## 2023-03-14 NOTE — ANESTHESIA POSTPROCEDURE EVALUATION
Patient: Slick Lizarraga    Procedure: Procedure(s):  ENDOSCOPIC RETROGRADE CHOLANGIOPANCREATOGRAPHY with biliary and  pancreatic spinchterotomy, biliary stent placement       Anesthesia Type:  General    Note:  Disposition: Outpatient   Postop Pain Control: Uneventful            Sign Out: Well controlled pain   PONV: No   Neuro/Psych: Uneventful            Sign Out: Acceptable/Baseline neuro status   Airway/Respiratory: Uneventful            Sign Out: Acceptable/Baseline resp. status   CV/Hemodynamics: Uneventful            Sign Out: Acceptable CV status; No obvious hypovolemia; No obvious fluid overload   Other NRE: NONE   DID A NON-ROUTINE EVENT OCCUR? No           Last vitals:  Vitals Value Taken Time   /88 03/14/23 0945   Temp 36.7  C (98.1  F) 03/14/23 0930   Pulse 74 03/14/23 0957   Resp 18 03/14/23 0930   SpO2 98 % 03/14/23 0957   Vitals shown include unvalidated device data.    Electronically Signed By: Lisa Guevara MD  March 14, 2023  9:58 AM

## 2023-03-14 NOTE — OR NURSING
Paged Dr. Friend: The discharge order reconciliation is not complete for patient. Just wanted to confirm if all meds were okay to resume as normal and if there were any specific instructions for him?

## 2023-03-14 NOTE — ANESTHESIA PREPROCEDURE EVALUATION
Anesthesia Pre-Procedure Evaluation    Patient: Slick Lizarraga   MRN: [id number] : 1965        Procedure : Procedure(s):  ENDOSCOPIC RETROGRADE CHOLANGIOPANCREATOGRAPHY          Past Medical History:   Diagnosis Date     Diabetes (H)      Gout      Hypertension       Past Surgical History:   Procedure Laterality Date     ENDOSCOPIC RETROGRADE CHOLANGIOPANCREATOGRAPHY        Allergies   Allergen Reactions     Erythromycin Rash      Social History     Tobacco Use     Smoking status: Never     Smokeless tobacco: Current     Types: Chew   Substance Use Topics     Alcohol use: Not Currently      Wt Readings from Last 1 Encounters:   23 101.8 kg (224 lb 6.9 oz)        Anesthesia Evaluation   Pt has had prior anesthetic. Type: General and MAC.        ROS/MED HX  ENT/Pulmonary:       Neurologic:       Cardiovascular:     (+) hypertension-----    METS/Exercise Tolerance:     Hematologic:       Musculoskeletal:   (+) arthritis,     GI/Hepatic:       Renal/Genitourinary:       Endo:     (+) type II DM,  (-) Type I DM   Psychiatric/Substance Use:       Infectious Disease:       Malignancy:   (+) Malignancy, History of Other.Other CA Pancreatic mass status post.    Other:            Physical Exam    Airway        Mallampati: II   TM distance: > 3 FB   Neck ROM: full   Mouth opening: > 3 cm    Respiratory Devices and Support         Dental       (+) Minor Abnormalities - some fillings, tiny chips    B=Bridge, C=Chipped, L=Loose, M=Missing    Cardiovascular   cardiovascular exam normal       Rhythm and rate: regular and normal     Pulmonary   pulmonary exam normal        breath sounds clear to auscultation           OUTSIDE LABS:  CBC:   Lab Results   Component Value Date    WBC 6.7 2023    HGB 13.8 2023    HCT 43.0 2023     2023     BMP:   Lab Results   Component Value Date     (H) 2023     COAGS:   Lab Results   Component Value Date    INR 1.36 (H) 2023     POC: No  results found for: BGM, HCG, HCGS  HEPATIC: No results found for: ALBUMIN, PROTTOTAL, ALT, AST, GGT, ALKPHOS, BILITOTAL, BILIDIRECT, DARIN  OTHER:   Lab Results   Component Value Date    LIPASE 102 (H) 03/14/2023    AMYLASE 44 03/14/2023       Anesthesia Plan    ASA Status:  3      Anesthesia Type: General.     - Airway: ETT   Induction: Propofol, Intravenous.   Maintenance: Balanced.   Techniques and Equipment:     - Airway: Video-Laryngoscope         Consents    Anesthesia Plan(s) and associated risks, benefits, and realistic alternatives discussed. Questions answered and patient/representative(s) expressed understanding.     - Discussed: Risks, Benefits and Alternatives for the PROCEDURE were discussed     - Discussed with:  Patient      - Extended Intubation/Ventilatory Support Discussed: No.      - Patient is DNR/DNI Status: No    Use of blood products discussed: No .     Postoperative Care    Pain management: IV analgesics.   PONV prophylaxis: Ondansetron (or other 5HT-3), Dexamethasone or Solumedrol, Background Propofol Infusion     Comments:                Lisa Guevara MD

## 2023-03-14 NOTE — OR NURSING
Paged Dr. Friend:  Patient instructed to check glucose at home tonight as he received IV insulin. Patient endorsed that he does not take insulin and he did not bring his glucometer with him and is staying in a hotel overnight. Glucose 364.     Paged Dr. Espinosa:   Patient okay to discharge. Patient states he will take PO medications this evening when he returns to hotel room.

## 2023-03-14 NOTE — ANESTHESIA PROCEDURE NOTES
Airway       Patient location during procedure: OR       Procedure Start/Stop Times: 3/14/2023 8:14 AM  Staff -        Anesthesiologist:  Lisa Guevara MD       CRNA: Kelsey Escudero APRN CRNA       Performed By: CRNA  Consent for Airway        Urgency: elective  Indications and Patient Condition       Indications for airway management: marietta-procedural       Induction type:intravenous       Mask difficulty assessment: 2 - vent by mask + OA or adjuvant +/- NMBA (two handed due to beard leak)    Final Airway Details       Final airway type: endotracheal airway       Successful airway: ETT - single and Oral  Endotracheal Airway Details        ETT size (mm): 7.5       Cuffed: yes       Successful intubation technique: direct laryngoscopy       DL Blade Type: Lopez 2       Grade View of Cords: 1 (clear and open)       Adjucts: stylet       Position: Right       Measured from: lips       Secured at (cm): 24       Bite block used: Molar (endo)    Post intubation assessment        Placement verified by: capnometry, equal breath sounds and chest rise        Number of attempts at approach: 1       Number of other approaches attempted: 0       Secured with: other (comment) (tube tamer)       Ease of procedure: easy       Dentition: Intact and Unchanged    Medication(s) Administered   Medication Administration Time: 3/14/2023 8:14 AM

## 2023-03-16 LAB — ERCP: NORMAL

## 2023-03-17 NOTE — PROGRESS NOTES
Department of Surgery  Division of Surgical Oncology    New Patient Consultation  Mar 20, 2023    Slick Lizarraga is a 57 year old male who presents with pancreatic cancer.  He was referred by Dr Roman. He is joined today by his wife.    History of Present Illness     Mr. Lizarraga presented with jaundice and significant weight loss, with 4 days of progressive abdominal pain in his RLQ. CT showed a 4.6cm pancreatic head mass. ERCP was done on 2/28 and 3/2, neither attempt was successful at passing beyond a point of biliary obstruction.    MRI/MRCP on 2/27/23 showed an ill defined mass in the head of the pancreas, 4.6cm in greatest dimension, with abrupt obstruction of the CBD. PD was also dilated distally, up to 7mm. Bilirubin on that date was 9.9.     EUS FNA was done on 2/28/23. This showed chronic pancreatitis with a mass measuring 4.2cm in the head of the pancreas. No mention was made of vascular findings. Path was reported on 3/2/23, as positive for malignancy, adenocarcinoma. ERCP was done on 3/2/23. Guidewire could not be passed past the proximal CBD.     I personally reviewed extensive documentation from [email or web address], dated 3/13 (scan date) in our system, to obtain the above results.    He was referred to Dr. Roman, who did an ERCP on 3/14/23. He was successful entering the distal bile duct and opacified bilateral intrahepatic branches. The mid bile duct was severely narrowed. An uncovered metal stent was placed.    Since the stent, Mr. Lizarraga has been feeling better. He does have abdominal pain radiating to his back for which he is taking Oxycodone, 5mg, 3-4 times daily. He is having a BM every 2-3 days. His appetite is reduced. He has lost a few pounds in the last week and as much as 50lbs over the last several months. He did have dark urine, light stools, and jaundice, although those are all improving.    He is diabetic and is not on insulin; he takes Metformin and other anti-hyperglycemic  medications.      Past Medical History:   Diagnosis Date     Diabetes (H)      Gout      Hypertension      Obstructive sleep apnea syndrome        Past Surgical History:   Procedure Laterality Date     ENDOSCOPIC RETROGRADE CHOLANGIOPANCREATOGRAM N/A 3/14/2023    Procedure: ENDOSCOPIC RETROGRADE CHOLANGIOPANCREATOGRAPHY with biliary and  pancreatic spinchterotomy, biliary stent placement;  Surgeon: Ray Roman MD;  Location: [facility] OR     ENDOSCOPIC RETROGRADE CHOLANGIOPANCREATOGRAPHY         Current Outpatient Medications   Medication Sig Dispense Refill     acetaminophen (TYLENOL) 500 MG tablet Take 1,000 mg by mouth as needed       allopurinol (ZYLOPRIM) 100 MG tablet Take 1 tablet by mouth daily       glipiZIDE (GLUCOTROL) 10 MG tablet Take 10 mg by mouth daily       hydrOXYzine (ATARAX) 10 MG tablet Take 10 mg by mouth 3 times daily as needed for itching       metFORMIN (GLUCOPHAGE) 1000 MG tablet Take 1 tablet by mouth 2 times daily (with meals)       metoprolol succinate ER (TOPROL XL) 25 MG 24 hr tablet Take 1 tablet by mouth daily       oxyCODONE (ROXICODONE) 5 MG tablet TAKE 1 TO 2 TABLETS BY MOUTH EVERY 4 HOURS AS NEEDED FOR PAIN FOR UP TO 3 DAYS       semaglutide (OZEMPIC, 0.25 OR 0.5 MG/DOSE,) 2 MG/1.5ML SOPN pen Inject 0.5 mg Subcutaneous once a week       CONTOUR NEXT TEST test strip  (Patient not taking: Reported on 3/20/2023)       Microlet Lancets MISC  (Patient not taking: Reported on 3/20/2023)       naloxone (NARCAN) 4 MG/0.1ML nasal spray Spray 4 mg in nostril (Patient not taking: Reported on 3/20/2023)       sildenafil (REVATIO) 20 MG tablet Take 20 mg by mouth 1-5 tablet one half hour to 4 hours before sexual activity. Do not exceed1 00mg in 24 hours (Patient not taking: Reported on 3/20/2023)          Allergies   Allergen Reactions     Azithromycin Hives     Erythromycin Rash        Social History:   reports that he has never smoked. His smokeless tobacco use includes chew. He reports  that he does not currently use alcohol. He reports that he does not currently use drugs.    Family History:  Family History   Problem Relation Age of Onset     Lung Cancer Mother        Review of Systems:  A 14-point review of systems was performed, with no additional pertinent positives or negatives beyond those mentioned in the history of present illness.      Physical Exam     /70 (BP Location: Right arm, Patient Position: Sitting, Cuff Size: Adult Regular)   Pulse 101   Temp 97.9  F (36.6  C) (Oral)   Resp 18   Wt 97.8 kg (215 lb 11.2 oz)   SpO2 96%   BMI 30.08 kg/m       General: NAD, alert, oriented  HEENT: scleral icterus, EOMI  Pulm: non-labored breathing, equal excursion bilaterally  Abdomen: soft, non-tender, non-distended   Extremities: warm, no edema  Skin: no apparent rashes or lesions, +jaundice  Neuro: no significant functional deficit, able to ambulate to and from exam table without assistance    Imaging:  CT CAP 3/14/23:                                                             IMPRESSION:   1. Pancreatic head mass with encasement of the GDA and abutment (but  near encasement) of the portal confluence and common hepatic artery.  2. No evidence of metastatic disease in the chest, abdomen, or pelvis.  3. Mild splenomegaly.     Outside Records:  See HPI    Biopsy:  See HPI      Assessment & Plan     Slick Lizarraga is a 57 year old male with biopsy-proven PDAC of the pancreatic head, now successfully stented.    The natural history of pancreas cancer was discussed with the patient and accompanying family members. I explained in great detail the preferred sequence of treatment, and that pancreas cancer is only cured through a combination of chemotherapy and surgery. Our standard protocol is now to begin with upfront chemotherapy for at least 3 but usually 6 months. This allows us to test the tumor biology, see if the chemotherapy is working on his tumor, and perhaps shrink the tumor. Another  reason is that pancreas cancer surgery can include a high rate of complications and it can be difficult to tolerate chemotherapy after surgery.    His tumor is likely resectable although tumor shrinkage would be very helpful. There is some haziness around the common hepatic artery. The GDA is encased but it looks like the origin is free. There is a long-distance effacement of the portal vein. He does have some abnormalities in his liver - one was called by radiology as focal fat near the gallbladder, but there are other hypodensities that could be residual dilated bile ducts or, less likely, could be concerning for malignancy. His most recent scan, done and read here, is not concerning.    I have repeated his labs today and will send a CA 19-9.     We briefly talked about the Whipple procedure but will have a lengthier discussion when we are certain we will proceed.    All questions were answered to their apparent satisfaction, and contact information was provided should additional questions or concerns arise.    Plan Summary:  -Repeat labs today including CA 19-9  -I will see the patient back in 3 months after his next CT scan - please order this as a pancreas-protocol CT (fine cuts). If the images can be electronically uploaded, I can see him as a Virtual Visit (I have a [place] license)      Gray Farooq MD MPHS      Today's visit was centered around a new cancer diagnosis in the setting of additional medical comorbidities. The risk of additional morbidity or mortality with or without further treatment is high. Total time spent was 60 minutes, including but not limited to patient-facing time, chart review, review of tests/studies as noted above, and care coordination.

## 2023-03-19 ENCOUNTER — TELEPHONE (OUTPATIENT)
Dept: GASTROENTEROLOGY | Facility: CLINIC | Age: 58
End: 2023-03-19
Payer: COMMERCIAL

## 2023-03-19 DIAGNOSIS — R10.13 ABDOMINAL PAIN, EPIGASTRIC: ICD-10-CM

## 2023-03-19 DIAGNOSIS — C25.0 MALIGNANT NEOPLASM OF HEAD OF PANCREAS (H): Primary | ICD-10-CM

## 2023-03-19 NOTE — TELEPHONE ENCOUNTER
On Call Pancreas GI Note:    Phone call: 20 minutes    ERCP (3/14/23) and CT (3/14/23) reviewed. Patient with locally advanced PDAC. Visiting [facility] for GI and surg onc consults. From [place].     Wife calling to discuss abdominal pain over last few weeks and discuss nature of pain with pancreatic cancer.     Itching improved since ERCP, some improvement in energy. Pain remains.     Primary care physician called in oxycodone 5 mg, 1-2 tabs q 4 hrs, which has been helpful. Typically makes it 6-7 hours without needing medication. Dr. Roman also discharged patient with a refill of percocet. They are comfortable with this regimen.     Pain is moderate to severe, fatigue ongoing. This pain is not new, it has been progressive over the last 4-5 weeks. Wife confirms that there is no new/severe pain since the procedure. Denies fevers, chills, worsened jaundiced. Stooling appropriately and she has miralax on hand should he be constipated from the narcotics.     ED presentation precautions explained, she verbalized understanding. All questions answered.     They have a surgical oncology visit with Dr. Farooq tomorrow morning.    On call GI remains available.     Luis Mendiola MD, PGY5  Gastroenterology Fellow  [facility]  See [facility]/[place] for GI on-call information

## 2023-03-20 ENCOUNTER — APPOINTMENT (OUTPATIENT)
Dept: LAB | Facility: CLINIC | Age: 58
End: 2023-03-20
Attending: SURGERY
Payer: COMMERCIAL

## 2023-03-20 ENCOUNTER — ONCOLOGY VISIT (OUTPATIENT)
Dept: SURGERY | Facility: CLINIC | Age: 58
End: 2023-03-20
Attending: SURGERY
Payer: COMMERCIAL

## 2023-03-20 VITALS
SYSTOLIC BLOOD PRESSURE: 117 MMHG | RESPIRATION RATE: 18 BRPM | WEIGHT: 215.7 LBS | OXYGEN SATURATION: 96 % | HEART RATE: 101 BPM | TEMPERATURE: 97.9 F | BODY MASS INDEX: 30.08 KG/M2 | DIASTOLIC BLOOD PRESSURE: 70 MMHG

## 2023-03-20 DIAGNOSIS — C25.9 PANCREAS CANCER (H): ICD-10-CM

## 2023-03-20 LAB
ALBUMIN SERPL BCG-MCNC: 4.4 G/DL (ref 3.5–5.2)
ALP SERPL-CCNC: 262 U/L (ref 40–129)
ALT SERPL W P-5'-P-CCNC: 104 U/L (ref 10–50)
ANION GAP SERPL CALCULATED.3IONS-SCNC: 14 MMOL/L (ref 7–15)
AST SERPL W P-5'-P-CCNC: 73 U/L (ref 10–50)
BASOPHILS # BLD AUTO: 0.1 10E3/UL (ref 0–0.2)
BASOPHILS NFR BLD AUTO: 1 %
BILIRUB SERPL-MCNC: 4.8 MG/DL
BUN SERPL-MCNC: 17.8 MG/DL (ref 6–20)
CALCIUM SERPL-MCNC: 10.2 MG/DL (ref 8.6–10)
CHLORIDE SERPL-SCNC: 99 MMOL/L (ref 98–107)
CREAT SERPL-MCNC: 0.86 MG/DL (ref 0.67–1.17)
DEPRECATED HCO3 PLAS-SCNC: 23 MMOL/L (ref 22–29)
EOSINOPHIL # BLD AUTO: 0.4 10E3/UL (ref 0–0.7)
EOSINOPHIL NFR BLD AUTO: 4 %
ERYTHROCYTE [DISTWIDTH] IN BLOOD BY AUTOMATED COUNT: 12.1 % (ref 10–15)
GFR SERPL CREATININE-BSD FRML MDRD: >90 ML/MIN/1.73M2
GLUCOSE SERPL-MCNC: 350 MG/DL (ref 70–99)
HCT VFR BLD AUTO: 42.3 % (ref 40–53)
HGB BLD-MCNC: 14.6 G/DL (ref 13.3–17.7)
IMM GRANULOCYTES # BLD: 0 10E3/UL
IMM GRANULOCYTES NFR BLD: 0 %
INR PPP: 1.05 (ref 0.85–1.15)
LYMPHOCYTES # BLD AUTO: 1.9 10E3/UL (ref 0.8–5.3)
LYMPHOCYTES NFR BLD AUTO: 22 %
MCH RBC QN AUTO: 29.9 PG (ref 26.5–33)
MCHC RBC AUTO-ENTMCNC: 34.5 G/DL (ref 31.5–36.5)
MCV RBC AUTO: 87 FL (ref 78–100)
MONOCYTES # BLD AUTO: 1.2 10E3/UL (ref 0–1.3)
MONOCYTES NFR BLD AUTO: 14 %
NEUTROPHILS # BLD AUTO: 5 10E3/UL (ref 1.6–8.3)
NEUTROPHILS NFR BLD AUTO: 59 %
NRBC # BLD AUTO: 0 10E3/UL
NRBC BLD AUTO-RTO: 0 /100
PLATELET # BLD AUTO: 338 10E3/UL (ref 150–450)
POTASSIUM SERPL-SCNC: 4.3 MMOL/L (ref 3.4–5.3)
PROT SERPL-MCNC: 8.1 G/DL (ref 6.4–8.3)
RBC # BLD AUTO: 4.88 10E6/UL (ref 4.4–5.9)
SODIUM SERPL-SCNC: 136 MMOL/L (ref 136–145)
WBC # BLD AUTO: 8.4 10E3/UL (ref 4–11)

## 2023-03-20 PROCEDURE — 36415 COLL VENOUS BLD VENIPUNCTURE: CPT | Performed by: SURGERY

## 2023-03-20 PROCEDURE — 99212 OFFICE O/P EST SF 10 MIN: CPT | Performed by: SURGERY

## 2023-03-20 PROCEDURE — 85018 HEMOGLOBIN: CPT | Performed by: SURGERY

## 2023-03-20 PROCEDURE — 85610 PROTHROMBIN TIME: CPT | Performed by: SURGERY

## 2023-03-20 PROCEDURE — 80053 COMPREHEN METABOLIC PANEL: CPT | Performed by: SURGERY

## 2023-03-20 PROCEDURE — 84134 ASSAY OF PREALBUMIN: CPT | Performed by: SURGERY

## 2023-03-20 PROCEDURE — 86301 IMMUNOASSAY TUMOR CA 19-9: CPT | Performed by: SURGERY

## 2023-03-20 PROCEDURE — 99205 OFFICE O/P NEW HI 60 MIN: CPT | Performed by: SURGERY

## 2023-03-20 RX ORDER — OXYCODONE HYDROCHLORIDE 5 MG/1
TABLET ORAL
Status: ON HOLD | COMMUNITY
Start: 2023-03-17 | End: 2023-03-24

## 2023-03-20 RX ORDER — LANCETS
EACH MISCELLANEOUS
COMMUNITY
Start: 2022-09-21

## 2023-03-20 ASSESSMENT — PAIN SCALES - GENERAL: PAINLEVEL: MODERATE PAIN (5)

## 2023-03-20 NOTE — LETTER
3/20/2023         RE: Slick Lizarraga  [address]  [place] [address]        Dear Colleague,    Thank you for referring your patient, Slick Lizarraga, to the [facility]. Please see a copy of my visit note below.      Department of Surgery  Division of Surgical Oncology    New Patient Consultation  Mar 20, 2023    Slick Lizarraga is a 57 year old male who presents with pancreatic cancer.  He was referred by Dr Roman. He is joined today by his wife.    History of Present Illness     Mr. Lizarraga presented with jaundice and significant weight loss, with 4 days of progressive abdominal pain in his RLQ. CT showed a 4.6cm pancreatic head mass. ERCP was done on 2/28 and 3/2, neither attempt was successful at passing beyond a point of biliary obstruction.    MRI/MRCP on 2/27/23 showed an ill defined mass in the head of the pancreas, 4.6cm in greatest dimension, with abrupt obstruction of the CBD. PD was also dilated distally, up to 7mm. Bilirubin on that date was 9.9.     EUS FNA was done on 2/28/23. This showed chronic pancreatitis with a mass measuring 4.2cm in the head of the pancreas. No mention was made of vascular findings. Path was reported on 3/2/23, as positive for malignancy, adenocarcinoma. ERCP was done on 3/2/23. Guidewire could not be passed past the proximal CBD.     I personally reviewed extensive documentation from [employer], dated 3/13 (scan date) in our system, to obtain the above results.    He was referred to Dr. Roman, who did an ERCP on 3/14/23. He was successful entering the distal bile duct and opacified bilateral intrahepatic branches. The mid bile duct was severely narrowed. An uncovered metal stent was placed.    Since the stent, Mr. Lizarraga has been feeling better. He does have abdominal pain radiating to his back for which he is taking Oxycodone, 5mg, 3-4 times daily. He is having a BM every 2-3 days. His appetite is reduced. He has lost a few pounds in the last week  and as much as 50lbs over the last several months. He did have dark urine, light stools, and jaundice, although those are all improving.    He is diabetic and is not on insulin; he takes Metformin and other anti-hyperglycemic medications.      Past Medical History:   Diagnosis Date     Diabetes (H)      Gout      Hypertension      Obstructive sleep apnea syndrome        Past Surgical History:   Procedure Laterality Date     ENDOSCOPIC RETROGRADE CHOLANGIOPANCREATOGRAM N/A 3/14/2023    Procedure: ENDOSCOPIC RETROGRADE CHOLANGIOPANCREATOGRAPHY with biliary and  pancreatic spinchterotomy, biliary stent placement;  Surgeon: Ray Roman MD;  Location: [facility] OR     ENDOSCOPIC RETROGRADE CHOLANGIOPANCREATOGRAPHY         Current Outpatient Medications   Medication Sig Dispense Refill     acetaminophen (TYLENOL) 500 MG tablet Take 1,000 mg by mouth as needed       allopurinol (ZYLOPRIM) 100 MG tablet Take 1 tablet by mouth daily       glipiZIDE (GLUCOTROL) 10 MG tablet Take 10 mg by mouth daily       hydrOXYzine (ATARAX) 10 MG tablet Take 10 mg by mouth 3 times daily as needed for itching       metFORMIN (GLUCOPHAGE) 1000 MG tablet Take 1 tablet by mouth 2 times daily (with meals)       metoprolol succinate ER (TOPROL XL) 25 MG 24 hr tablet Take 1 tablet by mouth daily       oxyCODONE (ROXICODONE) 5 MG tablet TAKE 1 TO 2 TABLETS BY MOUTH EVERY 4 HOURS AS NEEDED FOR PAIN FOR UP TO 3 DAYS       semaglutide (OZEMPIC, 0.25 OR 0.5 MG/DOSE,) 2 MG/1.5ML SOPN pen Inject 0.5 mg Subcutaneous once a week       CONTOUR NEXT TEST test strip  (Patient not taking: Reported on 3/20/2023)       Microlet Lancets MISC  (Patient not taking: Reported on 3/20/2023)       naloxone (NARCAN) 4 MG/0.1ML nasal spray Spray 4 mg in nostril (Patient not taking: Reported on 3/20/2023)       sildenafil (REVATIO) 20 MG tablet Take 20 mg by mouth 1-5 tablet one half hour to 4 hours before sexual activity. Do not exceed1 00mg in 24 hours (Patient not  taking: Reported on 3/20/2023)          Allergies   Allergen Reactions     Azithromycin Hives     Erythromycin Rash        Social History:   reports that he has never smoked. His smokeless tobacco use includes chew. He reports that he does not currently use alcohol. He reports that he does not currently use drugs.    Family History:  Family History   Problem Relation Age of Onset     Lung Cancer Mother        Review of Systems:  A 14-point review of systems was performed, with no additional pertinent positives or negatives beyond those mentioned in the history of present illness.      Physical Exam     /70 (BP Location: Right arm, Patient Position: Sitting, Cuff Size: Adult Regular)   Pulse 101   Temp 97.9  F (36.6  C) (Oral)   Resp 18   Wt 97.8 kg (215 lb 11.2 oz)   SpO2 96%   BMI 30.08 kg/m       General: NAD, alert, oriented  HEENT: scleral icterus, EOMI  Pulm: non-labored breathing, equal excursion bilaterally  Abdomen: soft, non-tender, non-distended   Extremities: warm, no edema  Skin: no apparent rashes or lesions, +jaundice  Neuro: no significant functional deficit, able to ambulate to and from exam table without assistance    Imaging:  CT CAP 3/14/23:                                                             IMPRESSION:   1. Pancreatic head mass with encasement of the GDA and abutment (but  near encasement) of the portal confluence and common hepatic artery.  2. No evidence of metastatic disease in the chest, abdomen, or pelvis.  3. Mild splenomegaly.     Outside Records:  See HPI    Biopsy:  See HPI      Assessment & Plan     Slick Lizarraga is a 57 year old male with biopsy-proven PDAC of the pancreatic head, now successfully stented.    The natural history of pancreas cancer was discussed with the patient and accompanying family members. I explained in great detail the preferred sequence of treatment, and that pancreas cancer is only cured through a combination of chemotherapy and surgery. Our  standard protocol is now to begin with upfront chemotherapy for at least 3 but usually 6 months. This allows us to test the tumor biology, see if the chemotherapy is working on his tumor, and perhaps shrink the tumor. Another reason is that pancreas cancer surgery can include a high rate of complications and it can be difficult to tolerate chemotherapy after surgery.    His tumor is likely resectable although tumor shrinkage would be very helpful. There is some haziness around the common hepatic artery. The GDA is encased but it looks like the origin is free. There is a long-distance effacement of the portal vein. He does have some abnormalities in his liver - one was called by radiology as focal fat near the gallbladder, but there are other hypodensities that could be residual dilated bile ducts or, less likely, could be concerning for malignancy. His most recent scan, done and read here, is not concerning.    I have repeated his labs today and will send a CA 19-9.     We briefly talked about the Whipple procedure but will have a lengthier discussion when we are certain we will proceed.    All questions were answered to their apparent satisfaction, and contact information was provided should additional questions or concerns arise.    Plan Summary:  -Repeat labs today including CA 19-9  -I will see the patient back in 3 months after his next CT scan - please order this as a pancreas-protocol CT (fine cuts). If the images can be electronically uploaded, I can see him as a Virtual Visit (I have a [place] license)      Gray Farooq MD MPHS      Today's visit was centered around a new cancer diagnosis in the setting of additional medical comorbidities. The risk of additional morbidity or mortality with or without further treatment is high. Total time spent was 60 minutes, including but not limited to patient-facing time, chart review, review of tests/studies as noted above, and care coordination.

## 2023-03-20 NOTE — NURSING NOTE
Chief Complaint   Patient presents with     Blood Draw     Labs drawn via  by RN in lab. VS taken.      Labs collected from venipuncture by RN. Vitals taken. Checked in for appointment(s).    Ana Menendez RN

## 2023-03-20 NOTE — NURSING NOTE
"Oncology Rooming Note    March 20, 2023 10:08 AM   Slick Lizarraga is a 57 year old male who presents for:    Chief Complaint   Patient presents with     Blood Draw     Labs drawn via  by RN in lab. VS taken.      Oncology Clinic Visit     Pancreas cancer     Initial Vitals: /70 (BP Location: Right arm, Patient Position: Sitting, Cuff Size: Adult Regular)   Pulse 101   Temp 97.9  F (36.6  C) (Oral)   Resp 18   Wt 97.8 kg (215 lb 11.2 oz)   SpO2 96%   BMI 30.08 kg/m   Estimated body mass index is 30.08 kg/m  as calculated from the following:    Height as of 3/14/23: 1.803 m (5' 11\").    Weight as of this encounter: 97.8 kg (215 lb 11.2 oz). Body surface area is 2.21 meters squared.  Moderate Pain (5) Comment: Data Unavailable   No LMP for male patient.  Allergies reviewed: Yes  Medications reviewed: Yes    Medications: Medication refills not needed today.  Pharmacy name entered into Need: [id number] - [place], [place] - [address]    Clinical concerns: No new clinical concerns other than reason for visit today.     Ana Jordan, EMT            "

## 2023-03-21 ENCOUNTER — APPOINTMENT (OUTPATIENT)
Dept: GENERAL RADIOLOGY | Facility: CLINIC | Age: 58
End: 2023-03-21
Attending: STUDENT IN AN ORGANIZED HEALTH CARE EDUCATION/TRAINING PROGRAM
Payer: COMMERCIAL

## 2023-03-21 ENCOUNTER — APPOINTMENT (OUTPATIENT)
Dept: CT IMAGING | Facility: CLINIC | Age: 58
End: 2023-03-21
Attending: EMERGENCY MEDICINE
Payer: COMMERCIAL

## 2023-03-21 ENCOUNTER — HOSPITAL ENCOUNTER (INPATIENT)
Facility: CLINIC | Age: 58
LOS: 3 days | Discharge: HOME | End: 2023-03-24
Attending: EMERGENCY MEDICINE | Admitting: STUDENT IN AN ORGANIZED HEALTH CARE EDUCATION/TRAINING PROGRAM
Payer: COMMERCIAL

## 2023-03-21 ENCOUNTER — ANESTHESIA (OUTPATIENT)
Dept: SURGERY | Facility: CLINIC | Age: 58
End: 2023-03-21
Payer: COMMERCIAL

## 2023-03-21 ENCOUNTER — ANESTHESIA EVENT (OUTPATIENT)
Dept: SURGERY | Facility: CLINIC | Age: 58
End: 2023-03-21
Payer: COMMERCIAL

## 2023-03-21 ENCOUNTER — APPOINTMENT (OUTPATIENT)
Dept: ULTRASOUND IMAGING | Facility: CLINIC | Age: 58
End: 2023-03-21
Attending: STUDENT IN AN ORGANIZED HEALTH CARE EDUCATION/TRAINING PROGRAM
Payer: COMMERCIAL

## 2023-03-21 DIAGNOSIS — R10.12 ABDOMINAL PAIN, LEFT UPPER QUADRANT: ICD-10-CM

## 2023-03-21 DIAGNOSIS — C25.9 MALIGNANT NEOPLASM OF PANCREAS, UNSPECIFIED LOCATION OF MALIGNANCY (H): ICD-10-CM

## 2023-03-21 DIAGNOSIS — T85.590A OBSTRUCTION OF BILIARY STENT, INITIAL ENCOUNTER: ICD-10-CM

## 2023-03-21 DIAGNOSIS — K81.0 ACUTE CHOLECYSTITIS: Primary | ICD-10-CM

## 2023-03-21 DIAGNOSIS — Z43.4 CHOLECYSTOSTOMY CARE (H): ICD-10-CM

## 2023-03-21 PROBLEM — M10.9 GOUT, UNSPECIFIED CAUSE, UNSPECIFIED CHRONICITY, UNSPECIFIED SITE: Status: ACTIVE | Noted: 2017-04-18

## 2023-03-21 PROBLEM — K85.90 ACUTE PANCREATITIS: Status: ACTIVE | Noted: 2022-01-24

## 2023-03-21 PROBLEM — I35.0 AORTIC VALVULAR STENOSIS: Status: ACTIVE | Noted: 2019-04-01

## 2023-03-21 PROBLEM — L97.411 DIABETIC ULCER OF RIGHT MIDFOOT ASSOCIATED WITH DIABETES MELLITUS DUE TO UNDERLYING CONDITION, LIMITED TO BREAKDOWN OF SKIN (H): Status: ACTIVE | Noted: 2022-11-21

## 2023-03-21 PROBLEM — R10.11 RUQ PAIN: Status: ACTIVE | Noted: 2022-01-24

## 2023-03-21 PROBLEM — E08.621 DIABETIC ULCER OF RIGHT MIDFOOT ASSOCIATED WITH DIABETES MELLITUS DUE TO UNDERLYING CONDITION, LIMITED TO BREAKDOWN OF SKIN (H): Status: ACTIVE | Noted: 2022-11-21

## 2023-03-21 PROBLEM — E80.6 HYPERBILIRUBINEMIA: Status: ACTIVE | Noted: 2023-02-28

## 2023-03-21 PROBLEM — R79.89 ABNORMAL LFTS (LIVER FUNCTION TESTS): Status: ACTIVE | Noted: 2023-02-28

## 2023-03-21 PROBLEM — K86.89 PANCREATIC MASS: Status: ACTIVE | Noted: 2023-02-28

## 2023-03-21 PROBLEM — E11.9 CONTROLLED TYPE 2 DIABETES MELLITUS WITHOUT COMPLICATION, WITHOUT LONG-TERM CURRENT USE OF INSULIN (H): Status: ACTIVE | Noted: 2017-04-18

## 2023-03-21 PROBLEM — E78.1 HYPERTRIGLYCERIDEMIA: Status: ACTIVE | Noted: 2017-04-18

## 2023-03-21 LAB
ALBUMIN SERPL BCG-MCNC: 4.1 G/DL (ref 3.5–5.2)
ALBUMIN SERPL BCG-MCNC: 4.3 G/DL (ref 3.5–5.2)
ALBUMIN UR-MCNC: 50 MG/DL
ALP SERPL-CCNC: 242 U/L (ref 40–129)
ALP SERPL-CCNC: 264 U/L (ref 40–129)
ALT SERPL W P-5'-P-CCNC: 112 U/L (ref 10–50)
ALT SERPL W P-5'-P-CCNC: 99 U/L (ref 10–50)
ANION GAP SERPL CALCULATED.3IONS-SCNC: 16 MMOL/L (ref 7–15)
ANION GAP SERPL CALCULATED.3IONS-SCNC: 17 MMOL/L (ref 7–15)
APPEARANCE UR: ABNORMAL
AST SERPL W P-5'-P-CCNC: 66 U/L (ref 10–50)
AST SERPL W P-5'-P-CCNC: 80 U/L (ref 10–50)
ATRIAL RATE - MUSE: 78 BPM
BASOPHILS # BLD AUTO: 0.1 10E3/UL (ref 0–0.2)
BASOPHILS NFR BLD AUTO: 1 %
BILIRUB DIRECT SERPL-MCNC: 3.2 MG/DL (ref 0–0.3)
BILIRUB DIRECT SERPL-MCNC: 3.2 MG/DL (ref 0–0.3)
BILIRUB SERPL-MCNC: 4.5 MG/DL
BILIRUB SERPL-MCNC: 4.7 MG/DL
BILIRUB SERPL-MCNC: 4.7 MG/DL
BILIRUB UR QL STRIP: ABNORMAL
BUN SERPL-MCNC: 18.2 MG/DL (ref 6–20)
BUN SERPL-MCNC: 19.8 MG/DL (ref 6–20)
CALCIUM SERPL-MCNC: 10.1 MG/DL (ref 8.6–10)
CALCIUM SERPL-MCNC: 9.7 MG/DL (ref 8.6–10)
CANCER AG19-9 SERPL IA-ACNC: 1464 U/ML
CHLORIDE SERPL-SCNC: 97 MMOL/L (ref 98–107)
CHLORIDE SERPL-SCNC: 98 MMOL/L (ref 98–107)
COLOR UR AUTO: ABNORMAL
CREAT SERPL-MCNC: 0.82 MG/DL (ref 0.67–1.17)
CREAT SERPL-MCNC: 0.82 MG/DL (ref 0.67–1.17)
DEPRECATED HCO3 PLAS-SCNC: 20 MMOL/L (ref 22–29)
DEPRECATED HCO3 PLAS-SCNC: 23 MMOL/L (ref 22–29)
DIASTOLIC BLOOD PRESSURE - MUSE: NORMAL MMHG
EOSINOPHIL # BLD AUTO: 0.2 10E3/UL (ref 0–0.7)
EOSINOPHIL NFR BLD AUTO: 2 %
ERYTHROCYTE [DISTWIDTH] IN BLOOD BY AUTOMATED COUNT: 12.2 % (ref 10–15)
GFR SERPL CREATININE-BSD FRML MDRD: >90 ML/MIN/1.73M2
GFR SERPL CREATININE-BSD FRML MDRD: >90 ML/MIN/1.73M2
GLUCOSE BLDC GLUCOMTR-MCNC: 328 MG/DL (ref 70–99)
GLUCOSE BLDC GLUCOMTR-MCNC: 340 MG/DL (ref 70–99)
GLUCOSE BLDC GLUCOMTR-MCNC: 344 MG/DL (ref 70–99)
GLUCOSE BLDC GLUCOMTR-MCNC: 344 MG/DL (ref 70–99)
GLUCOSE BLDC GLUCOMTR-MCNC: 381 MG/DL (ref 70–99)
GLUCOSE SERPL-MCNC: 333 MG/DL (ref 70–99)
GLUCOSE SERPL-MCNC: 337 MG/DL (ref 70–99)
GLUCOSE UR STRIP-MCNC: >=1000 MG/DL
HCT VFR BLD AUTO: 44.8 % (ref 40–53)
HGB BLD-MCNC: 14.8 G/DL (ref 13.3–17.7)
HGB UR QL STRIP: NEGATIVE
HOLD SPECIMEN: NORMAL
IMM GRANULOCYTES # BLD: 0.1 10E3/UL
IMM GRANULOCYTES NFR BLD: 0 %
INTERPRETATION ECG - MUSE: NORMAL
KETONES UR STRIP-MCNC: 10 MG/DL
LACTATE SERPL-SCNC: 2.8 MMOL/L (ref 0.7–2)
LACTATE SERPL-SCNC: 3.2 MMOL/L (ref 0.7–2)
LEUKOCYTE ESTERASE UR QL STRIP: NEGATIVE
LIPASE SERPL-CCNC: 65 U/L (ref 13–60)
LYMPHOCYTES # BLD AUTO: 2 10E3/UL (ref 0.8–5.3)
LYMPHOCYTES NFR BLD AUTO: 14 %
MAGNESIUM SERPL-MCNC: 1.8 MG/DL (ref 1.7–2.3)
MCH RBC QN AUTO: 29.5 PG (ref 26.5–33)
MCHC RBC AUTO-ENTMCNC: 33 G/DL (ref 31.5–36.5)
MCV RBC AUTO: 89 FL (ref 78–100)
MONOCYTES # BLD AUTO: 1.6 10E3/UL (ref 0–1.3)
MONOCYTES NFR BLD AUTO: 12 %
MUCOUS THREADS #/AREA URNS LPF: PRESENT /LPF
NEUTROPHILS # BLD AUTO: 9.9 10E3/UL (ref 1.6–8.3)
NEUTROPHILS NFR BLD AUTO: 71 %
NITRATE UR QL: NEGATIVE
NRBC # BLD AUTO: 0 10E3/UL
NRBC BLD AUTO-RTO: 0 /100
P AXIS - MUSE: 69 DEGREES
PH UR STRIP: 5.5 [PH] (ref 5–7)
PLATELET # BLD AUTO: 343 10E3/UL (ref 150–450)
POTASSIUM SERPL-SCNC: 4.6 MMOL/L (ref 3.4–5.3)
POTASSIUM SERPL-SCNC: 4.8 MMOL/L (ref 3.4–5.3)
PR INTERVAL - MUSE: 132 MS
PREALB SERPL IA-MCNC: 27 MG/DL (ref 15–45)
PROT SERPL-MCNC: 7.6 G/DL (ref 6.4–8.3)
PROT SERPL-MCNC: 8.1 G/DL (ref 6.4–8.3)
QRS DURATION - MUSE: 88 MS
QT - MUSE: 380 MS
QTC - MUSE: 433 MS
R AXIS - MUSE: 57 DEGREES
RBC # BLD AUTO: 5.02 10E6/UL (ref 4.4–5.9)
RBC URINE: 4 /HPF
SARS-COV-2 RNA RESP QL NAA+PROBE: NEGATIVE
SODIUM SERPL-SCNC: 134 MMOL/L (ref 136–145)
SODIUM SERPL-SCNC: 137 MMOL/L (ref 136–145)
SP GR UR STRIP: 1.03 (ref 1–1.03)
SYSTOLIC BLOOD PRESSURE - MUSE: NORMAL MMHG
T AXIS - MUSE: 49 DEGREES
TROPONIN T SERPL HS-MCNC: 10 NG/L
UROBILINOGEN UR STRIP-MCNC: 4 MG/DL
VENTRICULAR RATE- MUSE: 78 BPM
WBC # BLD AUTO: 13.3 10E3/UL (ref 4–11)
WBC URINE: 7 /HPF

## 2023-03-21 PROCEDURE — 99285 EMERGENCY DEPT VISIT HI MDM: CPT | Mod: 25 | Performed by: EMERGENCY MEDICINE

## 2023-03-21 PROCEDURE — 272N000001 HC OR GENERAL SUPPLY STERILE: Performed by: INTERNAL MEDICINE

## 2023-03-21 PROCEDURE — 120N000002 HC R&B MED SURG/OB UMMC

## 2023-03-21 PROCEDURE — 258N000003 HC RX IP 258 OP 636: Performed by: EMERGENCY MEDICINE

## 2023-03-21 PROCEDURE — 360N000082 HC SURGERY LEVEL 2 W/ FLUORO, PER MIN: Performed by: INTERNAL MEDICINE

## 2023-03-21 PROCEDURE — 250N000011 HC RX IP 250 OP 636: Performed by: STUDENT IN AN ORGANIZED HEALTH CARE EDUCATION/TRAINING PROGRAM

## 2023-03-21 PROCEDURE — 74177 CT ABD & PELVIS W/CONTRAST: CPT

## 2023-03-21 PROCEDURE — 0FC98ZZ EXTIRPATION OF MATTER FROM COMMON BILE DUCT, VIA NATURAL OR ARTIFICIAL OPENING ENDOSCOPIC: ICD-10-PCS | Performed by: INTERNAL MEDICINE

## 2023-03-21 PROCEDURE — 85025 COMPLETE CBC W/AUTO DIFF WBC: CPT | Performed by: EMERGENCY MEDICINE

## 2023-03-21 PROCEDURE — 250N000025 HC SEVOFLURANE, PER MIN: Performed by: INTERNAL MEDICINE

## 2023-03-21 PROCEDURE — 250N000013 HC RX MED GY IP 250 OP 250 PS 637: Performed by: EMERGENCY MEDICINE

## 2023-03-21 PROCEDURE — 74177 CT ABD & PELVIS W/CONTRAST: CPT | Mod: 26 | Performed by: RADIOLOGY

## 2023-03-21 PROCEDURE — 71046 X-RAY EXAM CHEST 2 VIEWS: CPT

## 2023-03-21 PROCEDURE — 255N000002 HC RX 255 OP 636: Performed by: INTERNAL MEDICINE

## 2023-03-21 PROCEDURE — 250N000011 HC RX IP 250 OP 636: Performed by: EMERGENCY MEDICINE

## 2023-03-21 PROCEDURE — 96375 TX/PRO/DX INJ NEW DRUG ADDON: CPT | Performed by: EMERGENCY MEDICINE

## 2023-03-21 PROCEDURE — 0F798ZZ DILATION OF COMMON BILE DUCT, VIA NATURAL OR ARTIFICIAL OPENING ENDOSCOPIC: ICD-10-PCS | Performed by: INTERNAL MEDICINE

## 2023-03-21 PROCEDURE — 93010 ELECTROCARDIOGRAM REPORT: CPT | Performed by: EMERGENCY MEDICINE

## 2023-03-21 PROCEDURE — 83690 ASSAY OF LIPASE: CPT | Performed by: EMERGENCY MEDICINE

## 2023-03-21 PROCEDURE — 710N000010 HC RECOVERY PHASE 1, LEVEL 2, PER MIN: Performed by: INTERNAL MEDICINE

## 2023-03-21 PROCEDURE — 71046 X-RAY EXAM CHEST 2 VIEWS: CPT | Mod: 26 | Performed by: RADIOLOGY

## 2023-03-21 PROCEDURE — 250N000013 HC RX MED GY IP 250 OP 250 PS 637: Performed by: STUDENT IN AN ORGANIZED HEALTH CARE EDUCATION/TRAINING PROGRAM

## 2023-03-21 PROCEDURE — 83605 ASSAY OF LACTIC ACID: CPT | Performed by: STUDENT IN AN ORGANIZED HEALTH CARE EDUCATION/TRAINING PROGRAM

## 2023-03-21 PROCEDURE — 80053 COMPREHEN METABOLIC PANEL: CPT | Performed by: EMERGENCY MEDICINE

## 2023-03-21 PROCEDURE — 99223 1ST HOSP IP/OBS HIGH 75: CPT | Mod: FS | Performed by: STUDENT IN AN ORGANIZED HEALTH CARE EDUCATION/TRAINING PROGRAM

## 2023-03-21 PROCEDURE — 96361 HYDRATE IV INFUSION ADD-ON: CPT | Performed by: EMERGENCY MEDICINE

## 2023-03-21 PROCEDURE — 36415 COLL VENOUS BLD VENIPUNCTURE: CPT | Performed by: STUDENT IN AN ORGANIZED HEALTH CARE EDUCATION/TRAINING PROGRAM

## 2023-03-21 PROCEDURE — 258N000003 HC RX IP 258 OP 636: Performed by: STUDENT IN AN ORGANIZED HEALTH CARE EDUCATION/TRAINING PROGRAM

## 2023-03-21 PROCEDURE — 76705 ECHO EXAM OF ABDOMEN: CPT | Mod: 26 | Performed by: RADIOLOGY

## 2023-03-21 PROCEDURE — 96374 THER/PROPH/DIAG INJ IV PUSH: CPT | Performed by: EMERGENCY MEDICINE

## 2023-03-21 PROCEDURE — 36415 COLL VENOUS BLD VENIPUNCTURE: CPT | Performed by: EMERGENCY MEDICINE

## 2023-03-21 PROCEDURE — 999N000128 HC STATISTIC PERIPHERAL IV START W/O US GUIDANCE

## 2023-03-21 PROCEDURE — 250N000012 HC RX MED GY IP 250 OP 636 PS 637: Performed by: STUDENT IN AN ORGANIZED HEALTH CARE EDUCATION/TRAINING PROGRAM

## 2023-03-21 PROCEDURE — 36415 COLL VENOUS BLD VENIPUNCTURE: CPT

## 2023-03-21 PROCEDURE — 84450 TRANSFERASE (AST) (SGOT): CPT | Performed by: STUDENT IN AN ORGANIZED HEALTH CARE EDUCATION/TRAINING PROGRAM

## 2023-03-21 PROCEDURE — 250N000011 HC RX IP 250 OP 636: Performed by: ANESTHESIOLOGY

## 2023-03-21 PROCEDURE — 99222 1ST HOSP IP/OBS MODERATE 55: CPT | Mod: 25 | Performed by: INTERNAL MEDICINE

## 2023-03-21 PROCEDURE — C1726 CATH, BAL DIL, NON-VASCULAR: HCPCS | Performed by: INTERNAL MEDICINE

## 2023-03-21 PROCEDURE — 258N000003 HC RX IP 258 OP 636: Performed by: NURSE ANESTHETIST, CERTIFIED REGISTERED

## 2023-03-21 PROCEDURE — 82962 GLUCOSE BLOOD TEST: CPT

## 2023-03-21 PROCEDURE — 999N000179 XR SURGERY CARM FLUORO LESS THAN 5 MIN W STILLS: Mod: TC

## 2023-03-21 PROCEDURE — 82248 BILIRUBIN DIRECT: CPT | Performed by: DIETITIAN, REGISTERED

## 2023-03-21 PROCEDURE — 370N000017 HC ANESTHESIA TECHNICAL FEE, PER MIN: Performed by: INTERNAL MEDICINE

## 2023-03-21 PROCEDURE — 87040 BLOOD CULTURE FOR BACTERIA: CPT

## 2023-03-21 PROCEDURE — 250N000011 HC RX IP 250 OP 636: Performed by: NURSE ANESTHETIST, CERTIFIED REGISTERED

## 2023-03-21 PROCEDURE — 83735 ASSAY OF MAGNESIUM: CPT

## 2023-03-21 PROCEDURE — 250N000009 HC RX 250: Performed by: EMERGENCY MEDICINE

## 2023-03-21 PROCEDURE — 999N000141 HC STATISTIC PRE-PROCEDURE NURSING ASSESSMENT: Performed by: INTERNAL MEDICINE

## 2023-03-21 PROCEDURE — 250N000009 HC RX 250: Performed by: NURSE ANESTHETIST, CERTIFIED REGISTERED

## 2023-03-21 PROCEDURE — 250N000009 HC RX 250: Performed by: INTERNAL MEDICINE

## 2023-03-21 PROCEDURE — 76705 ECHO EXAM OF ABDOMEN: CPT

## 2023-03-21 PROCEDURE — 84155 ASSAY OF PROTEIN SERUM: CPT | Performed by: STUDENT IN AN ORGANIZED HEALTH CARE EDUCATION/TRAINING PROGRAM

## 2023-03-21 PROCEDURE — 84484 ASSAY OF TROPONIN QUANT: CPT | Performed by: EMERGENCY MEDICINE

## 2023-03-21 PROCEDURE — 81001 URINALYSIS AUTO W/SCOPE: CPT | Performed by: STUDENT IN AN ORGANIZED HEALTH CARE EDUCATION/TRAINING PROGRAM

## 2023-03-21 PROCEDURE — U0003 INFECTIOUS AGENT DETECTION BY NUCLEIC ACID (DNA OR RNA); SEVERE ACUTE RESPIRATORY SYNDROME CORONAVIRUS 2 (SARS-COV-2) (CORONAVIRUS DISEASE [COVID-19]), AMPLIFIED PROBE TECHNIQUE, MAKING USE OF HIGH THROUGHPUT TECHNOLOGIES AS DESCRIBED BY CMS-2020-01-R: HCPCS | Performed by: INTERNAL MEDICINE

## 2023-03-21 PROCEDURE — 82248 BILIRUBIN DIRECT: CPT | Performed by: STUDENT IN AN ORGANIZED HEALTH CARE EDUCATION/TRAINING PROGRAM

## 2023-03-21 PROCEDURE — C1769 GUIDE WIRE: HCPCS | Performed by: INTERNAL MEDICINE

## 2023-03-21 PROCEDURE — 96376 TX/PRO/DX INJ SAME DRUG ADON: CPT | Performed by: EMERGENCY MEDICINE

## 2023-03-21 RX ORDER — INDOMETHACIN 50 MG/1
SUPPOSITORY RECTAL PRN
Status: DISCONTINUED | OUTPATIENT
Start: 2023-03-21 | End: 2023-03-21 | Stop reason: HOSPADM

## 2023-03-21 RX ORDER — HYDROMORPHONE HYDROCHLORIDE 1 MG/ML
0.5 INJECTION, SOLUTION INTRAMUSCULAR; INTRAVENOUS; SUBCUTANEOUS ONCE
Status: COMPLETED | OUTPATIENT
Start: 2023-03-21 | End: 2023-03-21

## 2023-03-21 RX ORDER — HYDROMORPHONE HYDROCHLORIDE 1 MG/ML
0.4 INJECTION, SOLUTION INTRAMUSCULAR; INTRAVENOUS; SUBCUTANEOUS EVERY 5 MIN PRN
Status: DISCONTINUED | OUTPATIENT
Start: 2023-03-21 | End: 2023-03-21 | Stop reason: HOSPADM

## 2023-03-21 RX ORDER — DIPHENHYDRAMINE HCL 25 MG
25 CAPSULE ORAL EVERY 6 HOURS PRN
Status: DISCONTINUED | OUTPATIENT
Start: 2023-03-21 | End: 2023-03-21 | Stop reason: HOSPADM

## 2023-03-21 RX ORDER — ONDANSETRON 2 MG/ML
4 INJECTION INTRAMUSCULAR; INTRAVENOUS EVERY 6 HOURS PRN
Status: DISCONTINUED | OUTPATIENT
Start: 2023-03-21 | End: 2023-03-24 | Stop reason: HOSPADM

## 2023-03-21 RX ORDER — ONDANSETRON 2 MG/ML
INJECTION INTRAMUSCULAR; INTRAVENOUS PRN
Status: DISCONTINUED | OUTPATIENT
Start: 2023-03-21 | End: 2023-03-21

## 2023-03-21 RX ORDER — NALOXONE HYDROCHLORIDE 0.4 MG/ML
0.2 INJECTION, SOLUTION INTRAMUSCULAR; INTRAVENOUS; SUBCUTANEOUS
Status: DISCONTINUED | OUTPATIENT
Start: 2023-03-21 | End: 2023-03-24 | Stop reason: HOSPADM

## 2023-03-21 RX ORDER — PROCHLORPERAZINE MALEATE 10 MG
10 TABLET ORAL EVERY 6 HOURS PRN
Status: DISCONTINUED | OUTPATIENT
Start: 2023-03-21 | End: 2023-03-24 | Stop reason: HOSPADM

## 2023-03-21 RX ORDER — LEVOFLOXACIN 5 MG/ML
INJECTION, SOLUTION INTRAVENOUS PRN
Status: DISCONTINUED | OUTPATIENT
Start: 2023-03-21 | End: 2023-03-21

## 2023-03-21 RX ORDER — KETAMINE HYDROCHLORIDE 10 MG/ML
INJECTION INTRAMUSCULAR; INTRAVENOUS PRN
Status: DISCONTINUED | OUTPATIENT
Start: 2023-03-21 | End: 2023-03-21

## 2023-03-21 RX ORDER — LIDOCAINE 40 MG/G
CREAM TOPICAL
Status: DISCONTINUED | OUTPATIENT
Start: 2023-03-21 | End: 2023-03-24 | Stop reason: HOSPADM

## 2023-03-21 RX ORDER — ALLOPURINOL 100 MG/1
100 TABLET ORAL DAILY
Status: DISCONTINUED | OUTPATIENT
Start: 2023-03-21 | End: 2023-03-24 | Stop reason: HOSPADM

## 2023-03-21 RX ORDER — IOPAMIDOL 755 MG/ML
134 INJECTION, SOLUTION INTRAVASCULAR ONCE
Status: COMPLETED | OUTPATIENT
Start: 2023-03-21 | End: 2023-03-21

## 2023-03-21 RX ORDER — PROPOFOL 10 MG/ML
INJECTION, EMULSION INTRAVENOUS PRN
Status: DISCONTINUED | OUTPATIENT
Start: 2023-03-21 | End: 2023-03-21

## 2023-03-21 RX ORDER — AMLODIPINE BESYLATE 5 MG/1
5 TABLET ORAL DAILY
Status: ON HOLD | COMMUNITY
End: 2023-03-22

## 2023-03-21 RX ORDER — FENTANYL CITRATE 50 UG/ML
25 INJECTION, SOLUTION INTRAMUSCULAR; INTRAVENOUS EVERY 5 MIN PRN
Status: DISCONTINUED | OUTPATIENT
Start: 2023-03-21 | End: 2023-03-21 | Stop reason: HOSPADM

## 2023-03-21 RX ORDER — SENNOSIDES 8.6 MG
2 TABLET ORAL AT BEDTIME
Status: DISCONTINUED | OUTPATIENT
Start: 2023-03-21 | End: 2023-03-24 | Stop reason: HOSPADM

## 2023-03-21 RX ORDER — LIDOCAINE HYDROCHLORIDE 20 MG/ML
INJECTION, SOLUTION INFILTRATION; PERINEURAL PRN
Status: DISCONTINUED | OUTPATIENT
Start: 2023-03-21 | End: 2023-03-21

## 2023-03-21 RX ORDER — PROCHLORPERAZINE 25 MG
25 SUPPOSITORY, RECTAL RECTAL EVERY 12 HOURS PRN
Status: DISCONTINUED | OUTPATIENT
Start: 2023-03-21 | End: 2023-03-24 | Stop reason: HOSPADM

## 2023-03-21 RX ORDER — HYDROMORPHONE HYDROCHLORIDE 1 MG/ML
0.2 INJECTION, SOLUTION INTRAMUSCULAR; INTRAVENOUS; SUBCUTANEOUS EVERY 5 MIN PRN
Status: DISCONTINUED | OUTPATIENT
Start: 2023-03-21 | End: 2023-03-21 | Stop reason: HOSPADM

## 2023-03-21 RX ORDER — ONDANSETRON 2 MG/ML
4 INJECTION INTRAMUSCULAR; INTRAVENOUS EVERY 30 MIN PRN
Status: DISCONTINUED | OUTPATIENT
Start: 2023-03-21 | End: 2023-03-21 | Stop reason: HOSPADM

## 2023-03-21 RX ORDER — NALOXONE HYDROCHLORIDE 0.4 MG/ML
0.4 INJECTION, SOLUTION INTRAMUSCULAR; INTRAVENOUS; SUBCUTANEOUS
Status: DISCONTINUED | OUTPATIENT
Start: 2023-03-21 | End: 2023-03-24 | Stop reason: HOSPADM

## 2023-03-21 RX ORDER — SODIUM CHLORIDE, SODIUM LACTATE, POTASSIUM CHLORIDE, CALCIUM CHLORIDE 600; 310; 30; 20 MG/100ML; MG/100ML; MG/100ML; MG/100ML
INJECTION, SOLUTION INTRAVENOUS CONTINUOUS
Status: DISCONTINUED | OUTPATIENT
Start: 2023-03-21 | End: 2023-03-22

## 2023-03-21 RX ORDER — DEXTROSE MONOHYDRATE 25 G/50ML
25-50 INJECTION, SOLUTION INTRAVENOUS
Status: DISCONTINUED | OUTPATIENT
Start: 2023-03-21 | End: 2023-03-24 | Stop reason: HOSPADM

## 2023-03-21 RX ORDER — SENNOSIDES 8.6 MG
8.6 TABLET ORAL 2 TIMES DAILY PRN
Status: DISCONTINUED | OUTPATIENT
Start: 2023-03-21 | End: 2023-03-21

## 2023-03-21 RX ORDER — LABETALOL HYDROCHLORIDE 5 MG/ML
10 INJECTION, SOLUTION INTRAVENOUS
Status: DISCONTINUED | OUTPATIENT
Start: 2023-03-21 | End: 2023-03-21 | Stop reason: HOSPADM

## 2023-03-21 RX ORDER — AMOXICILLIN 250 MG
1 CAPSULE ORAL 2 TIMES DAILY PRN
Status: DISCONTINUED | OUTPATIENT
Start: 2023-03-21 | End: 2023-03-24 | Stop reason: HOSPADM

## 2023-03-21 RX ORDER — POLYETHYLENE GLYCOL 3350 17 G/17G
17 POWDER, FOR SOLUTION ORAL DAILY
Status: DISCONTINUED | OUTPATIENT
Start: 2023-03-21 | End: 2023-03-24 | Stop reason: HOSPADM

## 2023-03-21 RX ORDER — DIPHENHYDRAMINE HYDROCHLORIDE 50 MG/ML
25 INJECTION INTRAMUSCULAR; INTRAVENOUS EVERY 6 HOURS PRN
Status: DISCONTINUED | OUTPATIENT
Start: 2023-03-21 | End: 2023-03-21 | Stop reason: HOSPADM

## 2023-03-21 RX ORDER — OXYCODONE HYDROCHLORIDE 5 MG/1
5-10 TABLET ORAL EVERY 4 HOURS PRN
Status: DISCONTINUED | OUTPATIENT
Start: 2023-03-21 | End: 2023-03-24 | Stop reason: HOSPADM

## 2023-03-21 RX ORDER — POLYETHYLENE GLYCOL 3350 17 G/17G
17 POWDER, FOR SOLUTION ORAL DAILY PRN
Status: DISCONTINUED | OUTPATIENT
Start: 2023-03-21 | End: 2023-03-24 | Stop reason: HOSPADM

## 2023-03-21 RX ORDER — ONDANSETRON 4 MG/1
4 TABLET, ORALLY DISINTEGRATING ORAL EVERY 6 HOURS PRN
Status: DISCONTINUED | OUTPATIENT
Start: 2023-03-21 | End: 2023-03-24 | Stop reason: HOSPADM

## 2023-03-21 RX ORDER — HYDROXYZINE HYDROCHLORIDE 10 MG/1
10 TABLET, FILM COATED ORAL 3 TIMES DAILY PRN
Status: DISCONTINUED | OUTPATIENT
Start: 2023-03-21 | End: 2023-03-24 | Stop reason: HOSPADM

## 2023-03-21 RX ORDER — IOPAMIDOL 510 MG/ML
INJECTION, SOLUTION INTRAVASCULAR PRN
Status: DISCONTINUED | OUTPATIENT
Start: 2023-03-21 | End: 2023-03-21 | Stop reason: HOSPADM

## 2023-03-21 RX ORDER — NICOTINE POLACRILEX 4 MG
15-30 LOZENGE BUCCAL
Status: DISCONTINUED | OUTPATIENT
Start: 2023-03-21 | End: 2023-03-24 | Stop reason: HOSPADM

## 2023-03-21 RX ORDER — METOPROLOL SUCCINATE 25 MG/1
25 TABLET, EXTENDED RELEASE ORAL DAILY
Status: DISCONTINUED | OUTPATIENT
Start: 2023-03-21 | End: 2023-03-24 | Stop reason: HOSPADM

## 2023-03-21 RX ORDER — ONDANSETRON 4 MG/1
4 TABLET, ORALLY DISINTEGRATING ORAL EVERY 30 MIN PRN
Status: DISCONTINUED | OUTPATIENT
Start: 2023-03-21 | End: 2023-03-21 | Stop reason: HOSPADM

## 2023-03-21 RX ORDER — ACETAMINOPHEN 325 MG/1
975 TABLET ORAL 3 TIMES DAILY
Status: DISCONTINUED | OUTPATIENT
Start: 2023-03-21 | End: 2023-03-24 | Stop reason: HOSPADM

## 2023-03-21 RX ORDER — SODIUM CHLORIDE, SODIUM LACTATE, POTASSIUM CHLORIDE, CALCIUM CHLORIDE 600; 310; 30; 20 MG/100ML; MG/100ML; MG/100ML; MG/100ML
INJECTION, SOLUTION INTRAVENOUS CONTINUOUS
Status: DISCONTINUED | OUTPATIENT
Start: 2023-03-21 | End: 2023-03-21 | Stop reason: HOSPADM

## 2023-03-21 RX ORDER — SODIUM CHLORIDE, SODIUM LACTATE, POTASSIUM CHLORIDE, CALCIUM CHLORIDE 600; 310; 30; 20 MG/100ML; MG/100ML; MG/100ML; MG/100ML
INJECTION, SOLUTION INTRAVENOUS CONTINUOUS PRN
Status: DISCONTINUED | OUTPATIENT
Start: 2023-03-21 | End: 2023-03-21

## 2023-03-21 RX ORDER — ONDANSETRON 2 MG/ML
8 INJECTION INTRAMUSCULAR; INTRAVENOUS ONCE
Status: COMPLETED | OUTPATIENT
Start: 2023-03-21 | End: 2023-03-21

## 2023-03-21 RX ORDER — HYDROMORPHONE HYDROCHLORIDE 1 MG/ML
0.5 INJECTION, SOLUTION INTRAMUSCULAR; INTRAVENOUS; SUBCUTANEOUS
Status: DISCONTINUED | OUTPATIENT
Start: 2023-03-21 | End: 2023-03-24 | Stop reason: HOSPADM

## 2023-03-21 RX ORDER — FENTANYL CITRATE 50 UG/ML
INJECTION, SOLUTION INTRAMUSCULAR; INTRAVENOUS PRN
Status: DISCONTINUED | OUTPATIENT
Start: 2023-03-21 | End: 2023-03-21

## 2023-03-21 RX ORDER — AMOXICILLIN 250 MG
2 CAPSULE ORAL 2 TIMES DAILY PRN
Status: DISCONTINUED | OUTPATIENT
Start: 2023-03-21 | End: 2023-03-24 | Stop reason: HOSPADM

## 2023-03-21 RX ORDER — PIPERACILLIN SODIUM, TAZOBACTAM SODIUM 4; .5 G/20ML; G/20ML
4.5 INJECTION, POWDER, LYOPHILIZED, FOR SOLUTION INTRAVENOUS EVERY 6 HOURS
Status: DISCONTINUED | OUTPATIENT
Start: 2023-03-21 | End: 2023-03-24 | Stop reason: HOSPADM

## 2023-03-21 RX ORDER — ACETAMINOPHEN 325 MG/1
650 TABLET ORAL EVERY 6 HOURS PRN
Status: DISCONTINUED | OUTPATIENT
Start: 2023-03-21 | End: 2023-03-21

## 2023-03-21 RX ORDER — FENTANYL CITRATE 50 UG/ML
50 INJECTION, SOLUTION INTRAMUSCULAR; INTRAVENOUS EVERY 5 MIN PRN
Status: DISCONTINUED | OUTPATIENT
Start: 2023-03-21 | End: 2023-03-21 | Stop reason: HOSPADM

## 2023-03-21 RX ADMIN — HYDROMORPHONE HYDROCHLORIDE 0.5 MG: 1 INJECTION, SOLUTION INTRAMUSCULAR; INTRAVENOUS; SUBCUTANEOUS at 04:11

## 2023-03-21 RX ADMIN — PHENYLEPHRINE HYDROCHLORIDE 0.5 MCG/KG/MIN: 10 INJECTION INTRAVENOUS at 19:25

## 2023-03-21 RX ADMIN — IOPAMIDOL 134 ML: 755 INJECTION, SOLUTION INTRAVENOUS at 05:11

## 2023-03-21 RX ADMIN — FENTANYL CITRATE 100 MCG: 50 INJECTION, SOLUTION INTRAMUSCULAR; INTRAVENOUS at 19:10

## 2023-03-21 RX ADMIN — PHENYLEPHRINE HYDROCHLORIDE 200 MCG: 10 INJECTION INTRAVENOUS at 19:23

## 2023-03-21 RX ADMIN — METOPROLOL SUCCINATE 25 MG: 25 TABLET, EXTENDED RELEASE ORAL at 09:59

## 2023-03-21 RX ADMIN — LIDOCAINE HYDROCHLORIDE 30 ML: 20 SOLUTION ORAL; TOPICAL at 04:08

## 2023-03-21 RX ADMIN — INSULIN ASPART 4 UNITS: 100 INJECTION, SOLUTION INTRAVENOUS; SUBCUTANEOUS at 11:33

## 2023-03-21 RX ADMIN — HYDROMORPHONE HYDROCHLORIDE 0.5 MG: 1 INJECTION, SOLUTION INTRAMUSCULAR; INTRAVENOUS; SUBCUTANEOUS at 18:32

## 2023-03-21 RX ADMIN — SODIUM CHLORIDE 1000 ML: 9 INJECTION, SOLUTION INTRAVENOUS at 03:42

## 2023-03-21 RX ADMIN — Medication 40 MG: at 19:31

## 2023-03-21 RX ADMIN — INSULIN ASPART 4 UNITS: 100 INJECTION, SOLUTION INTRAVENOUS; SUBCUTANEOUS at 17:11

## 2023-03-21 RX ADMIN — PROPOFOL 180 MG: 10 INJECTION, EMULSION INTRAVENOUS at 19:11

## 2023-03-21 RX ADMIN — PIPERACILLIN AND TAZOBACTAM 4.5 G: 4; .5 INJECTION, POWDER, FOR SOLUTION INTRAVENOUS at 14:26

## 2023-03-21 RX ADMIN — SUGAMMADEX 200 MG: 100 INJECTION, SOLUTION INTRAVENOUS at 20:06

## 2023-03-21 RX ADMIN — SODIUM CHLORIDE, POTASSIUM CHLORIDE, SODIUM LACTATE AND CALCIUM CHLORIDE: 600; 310; 30; 20 INJECTION, SOLUTION INTRAVENOUS at 18:14

## 2023-03-21 RX ADMIN — SODIUM CHLORIDE, POTASSIUM CHLORIDE, SODIUM LACTATE AND CALCIUM CHLORIDE 500 ML: 600; 310; 30; 20 INJECTION, SOLUTION INTRAVENOUS at 17:09

## 2023-03-21 RX ADMIN — ALLOPURINOL 100 MG: 100 TABLET ORAL at 15:50

## 2023-03-21 RX ADMIN — HYDROMORPHONE HYDROCHLORIDE 1 MG: 1 INJECTION, SOLUTION INTRAMUSCULAR; INTRAVENOUS; SUBCUTANEOUS at 06:27

## 2023-03-21 RX ADMIN — ACETAMINOPHEN 975 MG: 325 TABLET ORAL at 14:26

## 2023-03-21 RX ADMIN — SODIUM CHLORIDE, POTASSIUM CHLORIDE, SODIUM LACTATE AND CALCIUM CHLORIDE: 600; 310; 30; 20 INJECTION, SOLUTION INTRAVENOUS at 18:58

## 2023-03-21 RX ADMIN — OXYCODONE HYDROCHLORIDE 5 MG: 5 TABLET ORAL at 17:08

## 2023-03-21 RX ADMIN — ONDANSETRON 4 MG: 2 INJECTION INTRAMUSCULAR; INTRAVENOUS at 19:51

## 2023-03-21 RX ADMIN — PIPERACILLIN AND TAZOBACTAM 4.5 G: 4; .5 INJECTION, POWDER, FOR SOLUTION INTRAVENOUS at 19:40

## 2023-03-21 RX ADMIN — FAMOTIDINE 20 MG: 10 INJECTION, SOLUTION INTRAVENOUS at 06:06

## 2023-03-21 RX ADMIN — LEVOFLOXACIN 500 MG: 5 INJECTION, SOLUTION INTRAVENOUS at 19:08

## 2023-03-21 RX ADMIN — SODIUM CHLORIDE, POTASSIUM CHLORIDE, SODIUM LACTATE AND CALCIUM CHLORIDE 1000 ML: 600; 310; 30; 20 INJECTION, SOLUTION INTRAVENOUS at 12:23

## 2023-03-21 RX ADMIN — LIDOCAINE HYDROCHLORIDE 60 MG: 20 INJECTION, SOLUTION INFILTRATION; PERINEURAL at 19:10

## 2023-03-21 RX ADMIN — POLYETHYLENE GLYCOL 3350 17 G: 17 POWDER, FOR SOLUTION ORAL at 12:23

## 2023-03-21 RX ADMIN — SENNOSIDES 2 TABLET: 8.6 TABLET, FILM COATED ORAL at 22:35

## 2023-03-21 RX ADMIN — HYDROMORPHONE HYDROCHLORIDE 1 MG: 1 INJECTION, SOLUTION INTRAMUSCULAR; INTRAVENOUS; SUBCUTANEOUS at 05:07

## 2023-03-21 RX ADMIN — ONDANSETRON 8 MG: 2 INJECTION INTRAMUSCULAR; INTRAVENOUS at 03:42

## 2023-03-21 RX ADMIN — Medication 50 MG: at 19:12

## 2023-03-21 RX ADMIN — HYDROMORPHONE HYDROCHLORIDE 0.5 MG: 1 INJECTION, SOLUTION INTRAMUSCULAR; INTRAVENOUS; SUBCUTANEOUS at 09:12

## 2023-03-21 ASSESSMENT — ACTIVITIES OF DAILY LIVING (ADL)
ADLS_ACUITY_SCORE: 20
ADLS_ACUITY_SCORE: 20
ADLS_ACUITY_SCORE: 37
ADLS_ACUITY_SCORE: 35
ADLS_ACUITY_SCORE: 20
ADLS_ACUITY_SCORE: 35

## 2023-03-21 NOTE — ED PROVIDER NOTES
ED Provider Note  [facility]      History     Chief Complaint   Patient presents with     Abdominal Pain     HPI  Slick Lizarraga is a 57 year old male with a recent diagnosis of pancreatic adenocarcinoma (has not started chemotherapy yet), presents to the ED with complaint of left upper quadrant abdominal pain for about 12 hours.  He states that around 3 PM yesterday he started having achy pain.  He states he laid down in the evening for a little bit, pain improved, but then he ate something and pain got worse again.  He states has been constant.  Perhaps some very slight nausea when the pain is bad, but no vomiting or diarrhea.  He did have a normal bowel movement today.  No fever, cough, shortness of breath.  He states he did take 3 oxycodone within the last couple of hours without much improvement.    Past Medical History  Past Medical History:   Diagnosis Date     Diabetes (H)      Gout      Hypertension      Obstructive sleep apnea syndrome      Past Surgical History:   Procedure Laterality Date     ENDOSCOPIC RETROGRADE CHOLANGIOPANCREATOGRAM N/A 3/14/2023    Procedure: ENDOSCOPIC RETROGRADE CHOLANGIOPANCREATOGRAPHY with biliary and  pancreatic spinchterotomy, biliary stent placement;  Surgeon: Ray Roman MD;  Location: [facility] OR     ENDOSCOPIC RETROGRADE CHOLANGIOPANCREATOGRAPHY       oxyCODONE (ROXICODONE) 5 MG tablet  acetaminophen (TYLENOL) 500 MG tablet  allopurinol (ZYLOPRIM) 100 MG tablet  CONTOUR NEXT TEST test strip  glipiZIDE (GLUCOTROL) 10 MG tablet  hydrOXYzine (ATARAX) 10 MG tablet  metFORMIN (GLUCOPHAGE) 1000 MG tablet  metoprolol succinate ER (TOPROL XL) 25 MG 24 hr tablet  Microlet Lancets MISC  naloxone (NARCAN) 4 MG/0.1ML nasal spray  semaglutide (OZEMPIC, 0.25 OR 0.5 MG/DOSE,) 2 MG/1.5ML SOPN pen  sildenafil (REVATIO) 20 MG tablet      Allergies   Allergen Reactions     Azithromycin Hives     Erythromycin Rash     Family History  Family History   Problem  "Relation Age of Onset     Lung Cancer Mother      Social History   Social History     Tobacco Use     Smoking status: Never     Smokeless tobacco: Current     Types: Chew   Substance Use Topics     Alcohol use: Not Currently     Drug use: Not Currently         A medically appropriate review of systems was performed with pertinent positives and negatives noted in the HPI, and all other systems negative.    Physical Exam   BP: 137/82  Pulse: 110  Temp: 98.3  F (36.8  C)  Resp: 16  Height: 177.8 cm (5' 10\")  Weight: 99.3 kg (219 lb)  SpO2: 99 %  Physical Exam  Constitutional:       General: He is not in acute distress.     Appearance: Normal appearance. He is not toxic-appearing.   HENT:      Head: Atraumatic.      Nose: No congestion.   Eyes:      Conjunctiva/sclera: Conjunctivae normal.   Cardiovascular:      Rate and Rhythm: Normal rate.   Pulmonary:      Effort: Pulmonary effort is normal. No respiratory distress.   Abdominal:      General: Abdomen is flat.      Palpations: Abdomen is soft.      Tenderness: There is abdominal tenderness (mild epigastric tenderness, moderate LUQ tenderness with some voluntary guarding).   Musculoskeletal:         General: No deformity.      Cervical back: Neck supple. No tenderness.   Lymphadenopathy:      Cervical: No cervical adenopathy.   Skin:     General: Skin is warm.   Neurological:      Mental Status: He is alert.           ED Course, Procedures, & Data      Procedures            EKG Interpretation:      Interpreted by Beronica Jiang MD  Time reviewed:0715   Symptoms at time of EKG: LUQ pain   Rhythm: Normal sinus   Rate: Normal  Axis: Normal  Ectopy: None  Conduction: Normal  ST Segments/ T Waves: No ST-T wave changes and No acute ischemic changes  Q Waves: None  Comparison to prior:     Clinical Impression: normal EKG                  Results for orders placed or performed during the hospital encounter of 03/21/23   CT Abdomen Pelvis w Contrast     Status: None    " Narrative    EXAM: CT ABDOMEN AND PELVIS WITH CONTRAST  LOCATION: [facility]  DATE/TIME: 3/21/2023 5:18 AM    INDICATION: Recent diagnosis of pancreatic cancer. Left upper quadrant pain.  COMPARISON: 03/14/2023.    TECHNIQUE: CT scan of the abdomen and pelvis was performed following injection of IV contrast. Multiplanar reformats were obtained. Dose reduction techniques were used.  CONTRAST: 134 mL Isovue 370.    FINDINGS:    LOWER CHEST: Unremarkable.    HEPATOBILIARY: Very small 0.9 x 0.8 cm low-attenuation focus in the lateral segment of the left lobe of the liver (series 4 image 118), not convincingly changed since the prior study, allowing for differences in technique. A stent is present in the   common bile duct. Pneumobilia is present in the liver, within normal limits given the presence of the stent. The gallbladder is moderately distended and contains gas within its lumen that likely relates to the stent. No convincing pericholecystic   inflammatory changes.     SPLEEN: Mild splenomegaly again noted. The spleen measures 14.5 cm in craniocaudal dimension.    PANCREAS: 4.4 x 4.1 cm hypoenhancing mass centered in the pancreatic head (series 4 image 146) again noted and consistent with known pancreatic neoplasm. There is atrophy of the more peripheral pancreas in addition to moderate to marked dilatation of the   pancreatic duct peripheral to the aforementioned mass. The mass abuts the main portal vein at the confluence with the superior mesenteric and splenic veins and surrounds the gastroduodenal artery.    ADRENAL GLANDS: Unremarkable.    KIDNEYS/BLADDER: No suspicious renal lesions.    BOWEL: Normal appendix.    LYMPH NODES: A few borderline and mildly enlarged lymph nodes are present in the fat about the pancreatic head. For example, there is a 1.4 x 1.1 cm lymph node inferior to the uncinate process of the pancreas (series 4 image 192). A few nonenlarged  lymph   nodes are present adjacent to the distal esophagus and in the gastrohepatic ligament.    PELVIC ORGANS: Mild enlargement of the prostate gland.    MUSCULOSKELETAL: No acute findings.    OTHER: Atherosclerotic calcification in the abdominal aorta.      Impression    IMPRESSION:   1.  No convincing cause of acute pain identified in the abdomen or pelvis.  2.  4 cm pancreatic head mass again noted and consistent with the known pancreatic neoplasm.  3.  A few borderline and mildly enlarged peripancreatic lymph nodes in addition to a few nonenlarged lymph nodes elsewhere in the upper abdomen. These are nonspecific, but could represent metastatic neoplasm.  4.  0.9 cm low-attenuation focus in the left lobe of the liver, not convincingly changed since the comparison study, allowing for differences in technique. This is nonspecific, but a hepatic metastasis is a possibility. If clinically relevant, an MR of   the liver could be considered for further evaluation.     [place] Draw     Status: None    Narrative    The following orders were created for panel order [place] Draw.  Procedure                               Abnormality         Status                     ---------                               -----------         ------                     Extra Blue Top Tube[[id number]]                              Final result               Extra Red Top Tube[[id number]]                               Final result               Extra Green Top (Lithium...[[id number]]                      Final result               Extra Purple Top Tube[[id number]]                            Final result                 Please view results for these tests on the individual orders.   Extra Blue Top Tube     Status: None   Result Value Ref Range    Hold Specimen JIC    Extra Red Top Tube     Status: None   Result Value Ref Range    Hold Specimen JIC    Extra Green Top (Lithium Heparin) Tube     Status: None   Result Value Ref Range    Hold Specimen  JIC    Extra Purple Top Tube     Status: None   Result Value Ref Range    Hold Specimen JI    CBC with platelets and differential     Status: Abnormal   Result Value Ref Range    WBC Count 13.3 (H) 4.0 - 11.0 10e3/uL    RBC Count 5.02 4.40 - 5.90 10e6/uL    Hemoglobin 14.8 13.3 - 17.7 g/dL    Hematocrit 44.8 40.0 - 53.0 %    MCV 89 78 - 100 fL    MCH 29.5 26.5 - 33.0 pg    MCHC 33.0 31.5 - 36.5 g/dL    RDW 12.2 10.0 - 15.0 %    Platelet Count 343 150 - 450 10e3/uL    % Neutrophils 71 %    % Lymphocytes 14 %    % Monocytes 12 %    % Eosinophils 2 %    % Basophils 1 %    % Immature Granulocytes 0 %    NRBCs per 100 WBC 0 <1 /100    Absolute Neutrophils 9.9 (H) 1.6 - 8.3 10e3/uL    Absolute Lymphocytes 2.0 0.8 - 5.3 10e3/uL    Absolute Monocytes 1.6 (H) 0.0 - 1.3 10e3/uL    Absolute Eosinophils 0.2 0.0 - 0.7 10e3/uL    Absolute Basophils 0.1 0.0 - 0.2 10e3/uL    Absolute Immature Granulocytes 0.1 <=0.4 10e3/uL    Absolute NRBCs 0.0 10e3/uL   Comprehensive metabolic panel     Status: Abnormal   Result Value Ref Range    Sodium 137 136 - 145 mmol/L    Potassium 4.6 3.4 - 5.3 mmol/L    Chloride 98 98 - 107 mmol/L    Carbon Dioxide (CO2) 23 22 - 29 mmol/L    Anion Gap 16 (H) 7 - 15 mmol/L    Urea Nitrogen 19.8 6.0 - 20.0 mg/dL    Creatinine 0.82 0.67 - 1.17 mg/dL    Calcium 10.1 (H) 8.6 - 10.0 mg/dL    Glucose 333 (H) 70 - 99 mg/dL    Alkaline Phosphatase 264 (H) 40 - 129 U/L    AST 80 (H) 10 - 50 U/L     (H) 10 - 50 U/L    Protein Total 8.1 6.4 - 8.3 g/dL    Albumin 4.3 3.5 - 5.2 g/dL    Bilirubin Total 4.5 (H) <=1.2 mg/dL    GFR Estimate >90 >60 mL/min/1.73m2   Lipase     Status: Abnormal   Result Value Ref Range    Lipase 65 (H) 13 - 60 U/L   Troponin T, High Sensitivity     Status: Normal   Result Value Ref Range    Troponin T, High Sensitivity 10 <=22 ng/L   EKG 12 lead     Status: None (Preliminary result)   Result Value Ref Range    Systolic Blood Pressure  mmHg    Diastolic Blood Pressure  mmHg     Ventricular Rate 78 BPM    Atrial Rate 78 BPM    ME Interval 132 ms    QRS Duration 88 ms     ms    QTc 433 ms    P Axis 69 degrees    R AXIS 57 degrees    T Axis 49 degrees    Interpretation ECG Sinus rhythm  Normal ECG      CBC with platelets differential     Status: Abnormal    Narrative    The following orders were created for panel order CBC with platelets differential.  Procedure                               Abnormality         Status                     ---------                               -----------         ------                     CBC with platelets and d...[[id number]]  Abnormal            Final result                 Please view results for these tests on the individual orders.     Medications   lidocaine (viscous) (XYLOCAINE) 2 % 15 mL, alum & mag hydroxide-simethicone (MAALOX) 15 mL GI Cocktail (30 mLs Oral $Given 3/21/23 0408)   0.9% sodium chloride BOLUS (0 mLs Intravenous Stopped 3/21/23 0445)   ondansetron (ZOFRAN) injection 8 mg (8 mg Intravenous $Given 3/21/23 0342)   HYDROmorphone (PF) (DILAUDID) injection 0.5 mg (0.5 mg Intravenous $Given 3/21/23 0411)   HYDROmorphone (DILAUDID) injection 1 mg (1 mg Intravenous $Given 3/21/23 0507)   iopamidol (ISOVUE-370) solution 134 mL (134 mLs Intravenous $Given 3/21/23 0511)   sodium chloride (PF) 0.9% PF flush 83 mL (83 mLs Intravenous $Given 3/21/23 0511)   famotidine (PEPCID) injection 20 mg (20 mg Intravenous $Given 3/21/23 0606)   HYDROmorphone (DILAUDID) injection 1 mg (1 mg Intravenous $Given 3/21/23 0627)     Labs Ordered and Resulted from Time of ED Arrival to Time of ED Departure   CBC WITH PLATELETS AND DIFFERENTIAL - Abnormal       Result Value    WBC Count 13.3 (*)     RBC Count 5.02      Hemoglobin 14.8      Hematocrit 44.8      MCV 89      MCH 29.5      MCHC 33.0      RDW 12.2      Platelet Count 343      % Neutrophils 71      % Lymphocytes 14      % Monocytes 12      % Eosinophils 2      % Basophils 1      % Immature Granulocytes  0      NRBCs per 100 WBC 0      Absolute Neutrophils 9.9 (*)     Absolute Lymphocytes 2.0      Absolute Monocytes 1.6 (*)     Absolute Eosinophils 0.2      Absolute Basophils 0.1      Absolute Immature Granulocytes 0.1      Absolute NRBCs 0.0     COMPREHENSIVE METABOLIC PANEL - Abnormal    Sodium 137      Potassium 4.6      Chloride 98      Carbon Dioxide (CO2) 23      Anion Gap 16 (*)     Urea Nitrogen 19.8      Creatinine 0.82      Calcium 10.1 (*)     Glucose 333 (*)     Alkaline Phosphatase 264 (*)     AST 80 (*)      (*)     Protein Total 8.1      Albumin 4.3      Bilirubin Total 4.5 (*)     GFR Estimate >90     LIPASE - Abnormal    Lipase 65 (*)    TROPONIN T, HIGH SENSITIVITY - Normal    Troponin T, High Sensitivity 10     ROUTINE [facility] WITH MICROSCOPIC     CT Abdomen Pelvis w Contrast   Final Result   IMPRESSION:    1.  No convincing cause of acute pain identified in the abdomen or pelvis.   2.  4 cm pancreatic head mass again noted and consistent with the known pancreatic neoplasm.   3.  A few borderline and mildly enlarged peripancreatic lymph nodes in addition to a few nonenlarged lymph nodes elsewhere in the upper abdomen. These are nonspecific, but could represent metastatic neoplasm.   4.  0.9 cm low-attenuation focus in the left lobe of the liver, not convincingly changed since the comparison study, allowing for differences in technique. This is nonspecific, but a hepatic metastasis is a possibility. If clinically relevant, an MR of    the liver could be considered for further evaluation.                Critical care was not performed.     Medical Decision Making  The patient's presentation was of moderate complexity (an acute illness with systemic symptoms).    The patient's evaluation involved:  review of external note(s) from 2 sources (previous notes)  ordering and/or review of 3+ test(s) in this encounter (see separate area of note for details)  review of 3+ test result(s) ordered prior to  this encounter (see separate area of note for details)    The patient's management necessitated high risk (a parenteral controlled substance) and high risk (a decision regarding hospitalization).      Assessment & Plan    He was given a GI cocktail without any improvement.  He is tender primarily in the left upper quadrant, mild tenderness in the epigastrium.  Basic labs were checked and were not revealing.  He was given Dilaudid for pain control.  Abdominal CT was done, did not show any notable acute abnormality that would likely be the cause of his pain.  I did give him some famotidine, again without much improvement.  He is given further Dilaudid.  He will need admission for pain control, as I do not think he can be managed adequately with his normal oral oxycodone.  Cause of his pain is unclear but may be related to his underlying malignancy.  EKG did not show acute abnormality.  UA was ordered and is pending.     Dictation Disclaimer: Some of this Note has been completed with voice-recognition dictation software. Although errors are generally corrected real-time, there is the potential for a rare error to be present in the completed chart.      I have reviewed the nursing notes. I have reviewed the findings, diagnosis, plan and need for follow up with the patient.    New Prescriptions    No medications on file       Final diagnoses:   Abdominal pain, left upper quadrant   Malignant neoplasm of pancreas, unspecified location of malignancy (H)       Beronica Jiang  [facility] EMERGENCY DEPARTMENT  3/21/2023     Beronica Jiang MD  03/21/23 0781

## 2023-03-21 NOTE — PROVIDER NOTIFICATION
Any 1 paged at [phone]  pt wondering about plan, cause of pain, what the pain control plan will be, and when he will be getting morning medication.

## 2023-03-21 NOTE — PHARMACY-CONSULT NOTE
Pharmacy Delirium Chart Review    Upon chart review, the following medications may contribute to possible patient delirium: None found.   The following medications can cause some CNS effects: allopurinol (drowsiness), hydroxyzine (drowsiness), metoprolol (fatigue, memory impairment), and oxycodone (CNS depression).     Please consult unit pharmacist with further questions.    Kandy Spain, PharmD - PGY1 Pharmacy Resident   Phone/Pager: [phone]

## 2023-03-21 NOTE — ED TRIAGE NOTES
Patient presents to ED via EMS for left upper abdominal pain that started yesterday. Recently diagnosed with pancreatic cancer. Denies vomiting, endorses nausea.      Triage Assessment     Row Name 03/21/23 0314       Triage Assessment (Adult)    Airway WDL WDL       Respiratory WDL    Respiratory WDL WDL       Skin Circulation/Temperature WDL    Skin Circulation/Temperature WDL X  Jaundice       Cardiac WDL    Cardiac WDL WDL       Peripheral/Neurovascular WDL    Peripheral Neurovascular WDL WDL       Cognitive/Neuro/Behavioral WDL    Cognitive/Neuro/Behavioral WDL WDL

## 2023-03-21 NOTE — CONSULTS
GASTROENTEROLOGY CONSULTATION      Date of Admission:  3/21/2023           Reason for Consultation:   We were asked by Dr. Pugh  to evaluate this patient with suspected cholangitis and biliary stent malfunction           ASSESSMENT AND RECOMMENDATIONS:   Assessment:  Suspected cholangitis with possible stent malfunction  Pancreatic adenocarcinoma with obstructive jaundice s/p biliary stent 3/14/2023    57 year old male with a history of  pancreatic adenocarcinoma (dx 3/2023, s/p biliary stent placement on 3/14/23, plan for potential neoadjuvant therapy and potential surgery) who was brought to the ER by his wife due to persistent abd pain.     Pt has confusion, right upper quadrant tenderness and leucytosis. CT imaging is negative for any stent migration. Clinical picture is concerning for cholangitis.      Recommendations  -- Blood cultures and STAT antibiotic (combined gram negative and anaerobic coverage)  -- Continue NPO status   -- ERCP with stent exchange today     Thank you for involving us in this patient's care. Please do not hesitate to contact the GI service with any questions or concerns.     Pt care plan discussed with Dr. Hanks, GI staff physician.  Reccomendations were conveyed to primary team, patient's wife Ms Lizarraga was updated about this.     Bin Rojas MD  -------------------------------------------------------------------------------------------------------------------           History of Present Illness:   Slick Lizarraga is a 57 year old male with a history of pancreatic adenocarcinoma (dx 3/2023, s/p biliary stent placement on 3/14/23, plan for potential neoadjuvant therapy and potential surgery) who was brought to the ER by his wife due to persistent abd pain.     Pt is a resident of [place]. In Feb 2023 he was admitted with jaundice to [facility] facilities. While he was there imaging showed evidence of pancreatic mass with biliary stricture. ERCP was attempted  twice by local GI there but they couldn't go past the biliary stricture. EUS FNA was reported as pancreatic adenocarcinoma. He was then referred to [facility] for ERCP to relieve hepatobiliary obstruction. On 3/14/2023, he underwent ERCP with placement of metal biliary stent.     He is here along with his wife since the last ERCP procedure attending various appointments. He was seen by surgical oncology yesterday who suggested neoadjuvant chemotherapy first and then consider potential resection in future.     As per his wife, pt has reported abd pain since the procedure but this was controlled with pain medications. Last night at around 10 or 11 pm he had persistent pain that was not responding to oxycodone. Throughout night pt wasn't able to sleep much. Wife also reports that pt was confused since last night.They were initially planning to return home today but due to persistent pain and confusion, they decided to bring the patient to the ER for further work up.    In the ER, pt was noted to have leucocytosis to 66453 range, T shar 4.7, , AST 80, . CT abdomen was negative for any stent migration or any other acute intraabdominal pathology that would explain this picture.          Data:   Key relevant labs:    Latest Reference Range & Units 03/21/23 03:22   WBC 4.0 - 11.0 10e3/uL 13.3 (H)   Hemoglobin 13.3 - 17.7 g/dL 14.8   Hematocrit 40.0 - 53.0 % 44.8   Platelet Count 150 - 450 10e3/uL 343   (H): Data is abnormally high     Latest Reference Range & Units 03/21/23 03:22   Alkaline Phosphatase 40 - 129 U/L 264 (H)   ALT 10 - 50 U/L 112 (H)   AST 10 - 50 U/L 80 (H)   Bilirubin Direct 0.00 - 0.30 mg/dL  0.00 - 0.30 mg/dL 3.20 (H)  3.20 (H)   Bilirubin Total <=1.2 mg/dL  <=1.2 mg/dL 4.7 (H)  4.5 (H)   (H): Data is abnormally high    Key relevant imaging:  CT Abdomen and pelvis with IV contrast 3/21/2023  IMPRESSION:   1.  No convincing cause of acute pain identified in the abdomen or pelvis.  2.  4 cm pancreatic  "head mass again noted and consistent with the known pancreatic neoplasm.  3.  A few borderline and mildly enlarged peripancreatic lymph nodes in addition to a few nonenlarged lymph nodes elsewhere in the upper abdomen. These are nonspecific, but could represent metastatic neoplasm.  4.  0.9 cm low-attenuation focus in the left lobe of the liver, not convincingly changed since the comparison study, allowing for differences in technique. This is nonspecific, but a hepatic metastasis is a possibility. If clinically relevant, an MR of   the liver could be considered for further evaluation.          Previous Endoscopy:     3/14/2023    57 year old male with PMHx of  pancreatic head adenocarcinoma proven by EUS FNB at   [facility]. Developed jaundice, biliary obstruction. He  underwent an ERCP x 2 at [facility] in which only  the distal common bile duct was cannulated and unable  to advance beyond biliary stricture. On MRCP, the common bile duct measured 8 mm above the level of pancreatic mass and pancreatic duct measured 7 mm distal to the pancreatic head mass. He is referred for   repeat ERCP for biliary decompression.     Impression:            - Obstructed ventral pancreatic duct just above papilla                          - Biliary access by transpancreatic precut traction                          papillotomy, with exposure of bile duct. biliary                          sphincterotomy extended.                          - After deep biliary access, parallel high grade                          distal biliary stricture                          - 10 mm x 8 cm uncovered Flexus metal stent placed w                          excellent biliary drainage        Medications:   (Not in a hospital admission)            Physical Exam:   BP (!) 159/90   Pulse 82   Temp 98.3  F (36.8  C) (Oral)   Resp 16   Ht 1.778 m (5' 10\")   Wt 99.3 kg (219 lb)   SpO2 96%   BMI 31.42 kg/m    Wt:   Wt Readings from Last 2 Encounters: "   03/21/23 99.3 kg (219 lb)   03/20/23 97.8 kg (215 lb 11.2 oz)      Constitutional:not dyspneic/diaphoretic, no acute distress  Eyes: Sclera icteric  Ears/nose/mouth/throat: Normal oropharynx without ulcers or exudate, mucus membranes moist, hearing intact  Neck: supple  CV: RRR  Respiratory: Unlabored breathing  Abd: soft, right upper quadrant tenderness   Skin: Jaundice   Neuro: Alert, oriented to self, not to place or time, confabulation +ve , No asterixis  MSK: No gross deformities          Past Medical History:   Reviewed and edited as appropriate  Past Medical History:   Diagnosis Date     Diabetes (H)      Gout      Hypertension      Obstructive sleep apnea syndrome             Past Surgical History:   Reviewed and edited as appropriate   Past Surgical History:   Procedure Laterality Date     ENDOSCOPIC RETROGRADE CHOLANGIOPANCREATOGRAM N/A 3/14/2023    Procedure: ENDOSCOPIC RETROGRADE CHOLANGIOPANCREATOGRAPHY with biliary and  pancreatic spinchterotomy, biliary stent placement;  Surgeon: Ray Roman MD;  Location: [facility] OR     ENDOSCOPIC RETROGRADE CHOLANGIOPANCREATOGRAPHY              Social History:   Alcohol use: Denies   Smoking history: Chews smokeless tobacco  Living situation: Lives with his wife          Family History:   Reviewed and edited as appropriate  Family History   Problem Relation Age of Onset     Lung Cancer Mother             Allergies:   Reviewed and edited as appropriate     Allergies   Allergen Reactions     Azithromycin Hives     Erythromycin Rash              Review of Systems:     A complete 10 point review of systems was performed and is negative except as noted in the HPI

## 2023-03-21 NOTE — ANESTHESIA PREPROCEDURE EVALUATION
Anesthesia Pre-Procedure Evaluation    Patient: Slick Lizarraga   MRN: [id number] : 1965        Procedure : Procedure(s):  Endoscopic Retrograde Cholangiopancreatography, Exchange Tube/Stent          Past Medical History:   Diagnosis Date     Diabetes (H)      Gout      Hypertension      Obstructive sleep apnea syndrome       Past Surgical History:   Procedure Laterality Date     ENDOSCOPIC RETROGRADE CHOLANGIOPANCREATOGRAM N/A 3/14/2023    Procedure: ENDOSCOPIC RETROGRADE CHOLANGIOPANCREATOGRAPHY with biliary and  pancreatic spinchterotomy, biliary stent placement;  Surgeon: Ray Roman MD;  Location: [facility] OR     ENDOSCOPIC RETROGRADE CHOLANGIOPANCREATOGRAPHY        Allergies   Allergen Reactions     Azithromycin Hives     Erythromycin Rash      Social History     Tobacco Use     Smoking status: Never     Smokeless tobacco: Current     Types: Chew   Substance Use Topics     Alcohol use: Not Currently      Wt Readings from Last 1 Encounters:   23 99.3 kg (219 lb)        Anesthesia Evaluation            ROS/MED HX  ENT/Pulmonary:     (+) sleep apnea,     Neurologic:       Cardiovascular:     (+) hypertension-----    METS/Exercise Tolerance:     Hematologic:       Musculoskeletal: Comment: Gout      GI/Hepatic: Comment: S/p ERCP with stent placement , now presenting with worsening abdominal pain, concern for occlusion of stent    (+) liver disease,     Renal/Genitourinary:       Endo:     (+) type II DM,     Psychiatric/Substance Use:       Infectious Disease:       Malignancy:   (+) Malignancy, History of Other.Other CA pancreatic status post.    Other:            Physical Exam    Airway  airway exam normal      Mallampati: II   TM distance: > 3 FB   Neck ROM: full   Mouth opening: > 3 cm    Respiratory Devices and Support         Dental           Cardiovascular   cardiovascular exam normal          Pulmonary   pulmonary exam normal                OUTSIDE LABS:  CBC:   Lab Results   Component  Value Date    WBC 13.3 (H) 03/21/2023    WBC 8.4 03/20/2023    HGB 14.8 03/21/2023    HGB 14.6 03/20/2023    HCT 44.8 03/21/2023    HCT 42.3 03/20/2023     03/21/2023     03/20/2023     BMP:   Lab Results   Component Value Date     (L) 03/21/2023     03/21/2023    POTASSIUM 4.8 03/21/2023    POTASSIUM 4.6 03/21/2023    CHLORIDE 97 (L) 03/21/2023    CHLORIDE 98 03/21/2023    CO2 20 (L) 03/21/2023    CO2 23 03/21/2023    BUN 18.2 03/21/2023    BUN 19.8 03/21/2023    CR 0.82 03/21/2023    CR 0.82 03/21/2023     (H) 03/21/2023     (H) 03/21/2023     COAGS:   Lab Results   Component Value Date    INR 1.05 03/20/2023     POC: No results found for: BGM, HCG, HCGS  HEPATIC:   Lab Results   Component Value Date    ALBUMIN 4.1 03/21/2023    PROTTOTAL 7.6 03/21/2023    ALT 99 (H) 03/21/2023    AST 66 (H) 03/21/2023    ALKPHOS 242 (H) 03/21/2023    BILITOTAL 4.7 (H) 03/21/2023     OTHER:   Lab Results   Component Value Date    LACT 2.8 (H) 03/21/2023    A1C 8.1 (H) 03/14/2023    ARSH 9.7 03/21/2023    MAG 1.8 03/21/2023    LIPASE 65 (H) 03/21/2023    AMYLASE 44 03/14/2023       Anesthesia Plan    ASA Status:  3   NPO Status:  ELEVATED Aspiration Risk/Unknown    Anesthesia Type: General.     - Airway: ETT   Induction: RSI.   Maintenance: Balanced.        Consents    Anesthesia Plan(s) and associated risks, benefits, and realistic alternatives discussed. Questions answered and patient/representative(s) expressed understanding.     - Discussed: Risks, Benefits and Alternatives for BOTH SEDATION and the PROCEDURE were discussed     - Discussed with:  Patient      - Extended Intubation/Ventilatory Support Discussed: No.      - Patient is DNR/DNI Status: No    Use of blood products discussed: No .     Postoperative Care    Pain management: IV analgesics, Oral pain medications, Multi-modal analgesia.   PONV prophylaxis: Ondansetron (or other 5HT-3), Dexamethasone or Solumedrol     Comments:                 Jd Walker MD

## 2023-03-21 NOTE — H&P
[facility]    History and Physical - Medicine Service, [facility] TEAM 1       Date of Admission:  3/21/2023    Assessment & Plan      Slick Lizarraga is a 57 year old man with a PMH of recently diagnosed pancreatic adenocarcinoma, biliary stent placement 3/14/23, HTN, type 2 diabetes, gout, and DAYAMI who presents with epigastric abdominal pain for 12 hours, found to have elevated liver tests and leukocytosis admitted for further workup and pain management.     #Pain, nausea, c/f cholangitis  #Leukocytosis   #Pancreatic adenocarcinoma  Patient presented with 12 hours of epigastric abdominal pain subsequently generalized to the upper abdomen bilaterally with radiation to the right upper shoulder in the context of recent biliary stent placement. Labs with elevated AST/ALT, increased bilirubin and AP, and leukocytosis. Differential includes cholangitis, choledocholithiasis given liver test elevation and recent stent placement. Given the onset of pain immediately after eating, PUD and mesenteric ischemia are certainly possible, though these are less likely given the rapid onset of symptoms and known presence of a pancreatic mass. Will pursue infectious workup, consult with gastroenterology, and control pain as below. Currently, patient is hemodynamically stable.   - GI panc-bili consult - appreciate recommendations    - Patient to get ERCP 3/21/23 afternoon   - Zosyn 4.5 g Q6H for intraabdominal coverage given elevated WBC in immunocompromised patient    - Follow blood cultures, UA, CXR   - Pain control:   - Tylenol 975 mg scheduled TID   - Oxycodone 5-10 Q4H prn    - Dilaudid IV 0.5 mg Q4H prn   - Antiemetics:    >Ondansetron 4 mg Q6H PRN   >Prochlorperazine 10 mg Q6H PRN  - s/p 2 lVF, continue  mL/hr for 24 hours    #Hyperbilirubinemia  #Elevated liver tests   On initial labs, bilirubin was elevated to 4.5, ALT was increased to 80, and AST was increased to 112. These findings  "suggest gallbladder pathology, possibly due to an obstructed stent, though bili was higher last week prior to stent placement. As above, infectious etiology workup most likely to resolve symptoms.  - Trend liver tests  - GI to perform intervention as above     #0.9 cm low-attenuation focus in the left lobe of the liver  Found on CT scan, it is nonspecific and not significantly changed from prior study. Ddx includes metastasis.   - Consider MR of live outpatient for further evaluation     #Type 2 Diabetes (A1c 8.1%)  #Hyperglycemia  Ongoing T2D for 8-10 years. Does not take insulin at home and instead manages with metformin, glipizide and semaglutide.   - Medium sliding scale insulin   - Q4H BG checks while NPO   - Hypoglycemia protocol  - Hold PTA metformin 1 g daily in setting of risk for sepsis  - Hold PTA semaglutide 2 mg daily and glipizide 10 mg daily    Chronic medical issues:   #HTN: Metoprolol succinate 25 mg daily  #Gout: Allopurinol 100 mg daily  #Insomnia: Melatonin 1 mg QPM PRN     Diet: NPO for Medical/Clinical Reasons Except for: Meds, Ice Chips    DVT Prophylaxis: Pneumatic Compression Devices - given need for procedure   Kilpatrick Catheter: Not present  Fluids:  mL/hr  Lines: None     Cardiac Monitoring: None  Code Status: Full Code      Clinically Significant Risk Factors Present on Admission           # Hypercalcemia: Highest Ca = 10.2 mg/dL in last 2 days, will monitor as appropriate            # DMII: A1C = 8.1 % (Ref range: <5.7 %) within past 6 months    # Obesity: Estimated body mass index is 31.42 kg/m  as calculated from the following:    Height as of this encounter: 1.778 m (5' 10\").    Weight as of this encounter: 99.3 kg (219 lb).         Disposition Plan Disposition pending workup and evaluation.     Expected Discharge Date: 03/23/2023                The patient's care was discussed with the Attending Physician, Dr. Lillie Alaniz.    Xavier Riggs, MS3  Medical " Student    Resident/Fellow Attestation   I, Jaylon Pugh, was present with the medical student who participated in the service and in the documentation of the note.  I have verified the history and personally performed the physical exam and medical decision making.  I agree with the assessment and plan of care as documented in the note.      Jaylon Pugh MD  Internal Medicine, PGY-3    Date of Service (when I saw the patient): 03/21/23    Medicine Service, [facility] TEAM 1  [facility]  Securely message with [employer] (more info)  Text page via [facility] Paging/Directory   See signed in provider for up to date coverage information  ______________________________________________________________________    Chief Complaint   Epigastric abdominal pain    History is obtained from the patient and wife.     History of Present Illness   Slick Lizarraga is a 57 year old male with a PMH of recently diagnosed pancreatic adenocarcinoma, HTN, type 2 diabetes, gout, and DAYAMI who presents with abdominal pain for 12 hours.     He was recently diagnosed with pancreatic adenocarcinoma and has not yet begun chemotherapy. He lives in [place] and has been here for 1 week getting workup and had an ERCP last week with biliary stent placement.     Yesterday (3/20/23) around 3 pm, he first noticed feeling hungrier than is typical. He subsequently began eating a meal and quickly noticed pain localized to the LUQ. He describes the pain as constant, but notes that it acutely became more uncomfortable following eating. Since that time, the pain has become more diffuse across the superior portion of his abdomen bilaterally and radiates superiorly to his right shoulder and back. He tried taking 3 oxycodone and did not have any relief. He describes having a previous episode of pancreatitis about two years ago that felt similar to his current pain, but he current episode is much worse than anything he  had experienced in the past. He also notes a low appetite for the past 6-9 months and jaundice for the past 2-3 weeks.    In the ED, a GI cocktail was given without any relief. Basic labs demonstrated leukocytosis to 13.3, hyperglycemia to 333, AST/ALT to 80/112, AP to 264, lipase to 65, and normal troponin and INR. CT abdomen showed the previously identified 4 cm mass in the head of the pancreas with enlarged lymph nodes, but was otherwise unrevealing for an acute cause of pain. EKG was normal. US abdomen demonstrated hyperechoic liver suggesting hepatic steatosis, hepatomegaly, and pneumobilia throughout the liver. UA was ordered and pending at the time of admission.    Endorses increased thirst, light brown urine, upper and lower back pain.  Denies nausea, vomiting, diarrhea, fever, cough, SOB, dysuria.    Past Medical History    Past Medical History:   Diagnosis Date     Diabetes (H)      Gout      Hypertension      Obstructive sleep apnea syndrome      Past Surgical History   Past Surgical History:   Procedure Laterality Date     ENDOSCOPIC RETROGRADE CHOLANGIOPANCREATOGRAM N/A 3/14/2023    Procedure: ENDOSCOPIC RETROGRADE CHOLANGIOPANCREATOGRAPHY with biliary and  pancreatic spinchterotomy, biliary stent placement;  Surgeon: Ray Roman MD;  Location: [facility] OR     ENDOSCOPIC RETROGRADE CHOLANGIOPANCREATOGRAPHY       Prior to Admission Medications   Prior to Admission Medications   Prescriptions Last Dose Informant Patient Reported? Taking?   CONTOUR NEXT TEST test strip   Yes No   Patient not taking: Reported on 3/20/2023   Microlet Lancets MISC   Yes No   Patient not taking: Reported on 3/20/2023   acetaminophen (TYLENOL) 500 MG tablet   Yes No   Sig: Take 1,000 mg by mouth as needed   allopurinol (ZYLOPRIM) 100 MG tablet   Yes No   Sig: Take 1 tablet by mouth daily   amLODIPine (NORVASC) 5 MG tablet Unknown  Yes Yes   Sig: Take 5 mg by mouth daily   glipiZIDE (GLUCOTROL) 10 MG tablet   Yes No   Sig:  Take 10 mg by mouth daily   hydrOXYzine (ATARAX) 10 MG tablet   Yes No   Sig: Take 10 mg by mouth 3 times daily as needed for itching   metFORMIN (GLUCOPHAGE) 1000 MG tablet   Yes No   Sig: Take 1 tablet by mouth 2 times daily (with meals)   metoprolol succinate ER (TOPROL XL) 25 MG 24 hr tablet   Yes No   Sig: Take 1 tablet by mouth daily   naloxone (NARCAN) 4 MG/0.1ML nasal spray   Yes No   Sig: Spray 4 mg in nostril   Patient not taking: Reported on 3/20/2023   oxyCODONE (ROXICODONE) 5 MG tablet 3/21/2023 at 0030  Yes Yes   Sig: TAKE 1 TO 2 TABLETS BY MOUTH EVERY 4 HOURS AS NEEDED FOR PAIN FOR UP TO 3 DAYS   semaglutide (OZEMPIC, 0.25 OR 0.5 MG/DOSE,) 2 MG/1.5ML SOPN pen   Yes No   Sig: Inject 0.5 mg Subcutaneous once a week   sildenafil (REVATIO) 20 MG tablet   Yes No   Sig: Take 20 mg by mouth 1-5 tablet one half hour to 4 hours before sexual activity. Do not exceed1 00mg in 24 hours   Patient not taking: Reported on 3/20/2023      Facility-Administered Medications: None        Review of Systems    The 10 point Review of Systems is negative other than noted in the HPI or here.     Physical Exam   Vital Signs: Temp: 98.3  F (36.8  C) Temp src: Oral BP: (!) 182/88 Pulse: 82   Resp: 16 SpO2: 96 % O2 Device: None (Room air)    Weight: 219 lbs 0 oz    Constitutional: awake, alert, cooperative, no apparent distress, and appears older than stated age  HEENT:  EOMI, icteric conjunctival present  Hematologic / Lymphatic: no cervical or supraclavicular lymphadenopathy  Respiratory: no increased work of breathing, good air exchange, clear to auscultation bilaterally, no crackles or wheezing  Cardiovascular: normal rate, regular rhythm, systolic mumur  GI: soft, obese, non-distended, tenderness to palpation at RUQ and epigastric region, no rebound or guarding, normal bowel sounds  Skin: Jaundice present, no redness, warmth, or swelling, no other concerning lesions  Musculoskeletal: no lower extremity pitting edema present,  moving extremities spontaneously  Neurologic: alert and oriented to person, place, and situation, conversant, no focal deficits, gait slow but able to ambulate without assistance   Data     I have personally reviewed the following data over the past 24 hrs:    13.3 (H)  \   14.8   / 343     137 98 19.8 /  328 (H)   4.6 23 0.82 \       ALT: 112 (H) AST: 80 (H) AP: 264 (H) TBILI: 4.5 (H); 4.7 (H)   ALB: 4.3 TOT PROTEIN: 8.1 LIPASE: 65 (H)       Trop: 10 BNP: N/A       Imaging results reviewed over the past 24 hrs:   Recent Results (from the past 24 hour(s))   CT Abdomen Pelvis w Contrast    Narrative    EXAM: CT ABDOMEN AND PELVIS WITH CONTRAST  LOCATION: [facility]  DATE/TIME: 3/21/2023 5:18 AM    INDICATION: Recent diagnosis of pancreatic cancer. Left upper quadrant pain.  COMPARISON: 03/14/2023.    TECHNIQUE: CT scan of the abdomen and pelvis was performed following injection of IV contrast. Multiplanar reformats were obtained. Dose reduction techniques were used.  CONTRAST: 134 mL Isovue 370.    FINDINGS:    LOWER CHEST: Unremarkable.    HEPATOBILIARY: Very small 0.9 x 0.8 cm low-attenuation focus in the lateral segment of the left lobe of the liver (series 4 image 118), not convincingly changed since the prior study, allowing for differences in technique. A stent is present in the   common bile duct. Pneumobilia is present in the liver, within normal limits given the presence of the stent. The gallbladder is moderately distended and contains gas within its lumen that likely relates to the stent. No convincing pericholecystic   inflammatory changes.     SPLEEN: Mild splenomegaly again noted. The spleen measures 14.5 cm in craniocaudal dimension.    PANCREAS: 4.4 x 4.1 cm hypoenhancing mass centered in the pancreatic head (series 4 image 146) again noted and consistent with known pancreatic neoplasm. There is atrophy of the more peripheral pancreas in addition to  moderate to marked dilatation of the   pancreatic duct peripheral to the aforementioned mass. The mass abuts the main portal vein at the confluence with the superior mesenteric and splenic veins and surrounds the gastroduodenal artery.    ADRENAL GLANDS: Unremarkable.    KIDNEYS/BLADDER: No suspicious renal lesions.    BOWEL: Normal appendix.    LYMPH NODES: A few borderline and mildly enlarged lymph nodes are present in the fat about the pancreatic head. For example, there is a 1.4 x 1.1 cm lymph node inferior to the uncinate process of the pancreas (series 4 image 192). A few nonenlarged lymph   nodes are present adjacent to the distal esophagus and in the gastrohepatic ligament.    PELVIC ORGANS: Mild enlargement of the prostate gland.    MUSCULOSKELETAL: No acute findings.    OTHER: Atherosclerotic calcification in the abdominal aorta.      Impression    IMPRESSION:   1.  No convincing cause of acute pain identified in the abdomen or pelvis.  2.  4 cm pancreatic head mass again noted and consistent with the known pancreatic neoplasm.  3.  A few borderline and mildly enlarged peripancreatic lymph nodes in addition to a few nonenlarged lymph nodes elsewhere in the upper abdomen. These are nonspecific, but could represent metastatic neoplasm.  4.  0.9 cm low-attenuation focus in the left lobe of the liver, not convincingly changed since the comparison study, allowing for differences in technique. This is nonspecific, but a hepatic metastasis is a possibility. If clinically relevant, an MR of   the liver could be considered for further evaluation.     US Abdomen Limited Portable    Narrative    EXAMINATION: Limited Abdominal Ultrasound, 3/21/2023 11:11 AM     COMPARISON: CT abdomen pelvis 3/21/2023    HISTORY: Right upper quadrant pain. History of biliary colic and  biliary stent.    FINDINGS:   Fluid: No evidence of ascites or pleural effusions.    Liver: The liver demonstrates increased echogenicity, measuring 24  cm  in craniocaudal dimension, enlarged. There is no focal mass. Extensive  pneumobilia throughout the liver present.    Gallbladder: Not visualized sonographically. This may be obscured  given pneumobilia within the gallbladder lumen on CT making it  difficult to separate from adjacent bowel loops. Patient was  reportedly tender on scanning however this appeared to be more related  to the midline and left hepatic lobe, not centered over the  gallbladder fossa.    Bile Ducts: Extensive pneumobilia.  The common bile duct is difficult  to identify. A small portion of duct measuring 2 mm is noted. The  biliary stent on CT is not visualized sonographically.    Pancreas: Obscured due to midline shadowing gas.    Kidney: The right kidney measures 13.3 cm long. There is no  hydronephrosis or hydroureter, no shadowing renal calculi, cystic  lesion or mass.       Impression    IMPRESSION:   1.  Difficult sonographic evaluation with poor visualization of  midline structures.  2.  Liver is hyperechoic suggesting hepatic steatosis. Hepatomegaly.  The lesion described on CT is not visualized sonographically. Patient  was reportedly tender over the left hepatic lobe during scanning.  3.  Pneumobilia throughout the liver. Common bile duct difficult to  identify but does not appear significantly enlarged.  4.  Gallbladder not visualized sonographically, possibly obscured due  to pneumobilia within the lumen on CT making it difficult to separate  from adjacent bowel loops.    TEAGAN MCCULLOUGH MD         SYSTEM ID:  [id number]     Recent Labs   Lab 03/21/23  1131 03/21/23  0926 03/21/23  0322 03/20/23  0955   WBC  --   --  13.3* 8.4   HGB  --   --  14.8 14.6   MCV  --   --  89 87   PLT  --   --  343 338   INR  --   --   --  1.05   NA  --   --  137 136   POTASSIUM  --   --  4.6 4.3   CHLORIDE  --   --  98 99   CO2  --   --  23 23   BUN  --   --  19.8 17.8   CR  --   --  0.82 0.86   ANIONGAP  --   --  16* 14   ARSH  --   --  10.1* 10.2*    * 344* 333* 350*   ALBUMIN  --   --  4.3 4.4   PROTTOTAL  --   --  8.1 8.1   BILITOTAL  --   --  4.7*  4.5* 4.8*   ALKPHOS  --   --  264* 262*   ALT  --   --  112* 104*   AST  --   --  80* 73*   LIPASE  --   --  65*  --      NOTE: Data reviewed over the past 24 hrs contributes toward MDM complexity

## 2023-03-22 ENCOUNTER — APPOINTMENT (OUTPATIENT)
Dept: CT IMAGING | Facility: CLINIC | Age: 58
End: 2023-03-22
Attending: STUDENT IN AN ORGANIZED HEALTH CARE EDUCATION/TRAINING PROGRAM
Payer: COMMERCIAL

## 2023-03-22 ENCOUNTER — APPOINTMENT (OUTPATIENT)
Dept: GENERAL RADIOLOGY | Facility: CLINIC | Age: 58
End: 2023-03-22
Attending: STUDENT IN AN ORGANIZED HEALTH CARE EDUCATION/TRAINING PROGRAM
Payer: COMMERCIAL

## 2023-03-22 LAB
ALBUMIN SERPL BCG-MCNC: 3.2 G/DL (ref 3.5–5.2)
ALBUMIN SERPL BCG-MCNC: 3.3 G/DL (ref 3.5–5.2)
ALBUMIN SERPL BCG-MCNC: 3.5 G/DL (ref 3.5–5.2)
ALP SERPL-CCNC: 169 U/L (ref 40–129)
ALP SERPL-CCNC: 171 U/L (ref 40–129)
ALP SERPL-CCNC: 186 U/L (ref 40–129)
ALT SERPL W P-5'-P-CCNC: 72 U/L (ref 10–50)
ALT SERPL W P-5'-P-CCNC: 73 U/L (ref 10–50)
ALT SERPL W P-5'-P-CCNC: 76 U/L (ref 10–50)
ANION GAP SERPL CALCULATED.3IONS-SCNC: 11 MMOL/L (ref 7–15)
ANION GAP SERPL CALCULATED.3IONS-SCNC: 12 MMOL/L (ref 7–15)
ANION GAP SERPL CALCULATED.3IONS-SCNC: 14 MMOL/L (ref 7–15)
AST SERPL W P-5'-P-CCNC: 45 U/L (ref 10–50)
AST SERPL W P-5'-P-CCNC: 46 U/L (ref 10–50)
AST SERPL W P-5'-P-CCNC: ABNORMAL U/L
BASOPHILS # BLD AUTO: 0 10E3/UL (ref 0–0.2)
BASOPHILS NFR BLD AUTO: 0 %
BILIRUB SERPL-MCNC: 3.8 MG/DL
BILIRUB SERPL-MCNC: 4 MG/DL
BILIRUB SERPL-MCNC: 4.1 MG/DL
BUN SERPL-MCNC: 24.3 MG/DL (ref 6–20)
BUN SERPL-MCNC: 25.7 MG/DL (ref 6–20)
BUN SERPL-MCNC: 28.8 MG/DL (ref 6–20)
CALCIUM SERPL-MCNC: 9 MG/DL (ref 8.6–10)
CALCIUM SERPL-MCNC: 9.3 MG/DL (ref 8.6–10)
CALCIUM SERPL-MCNC: 9.4 MG/DL (ref 8.6–10)
CHLORIDE SERPL-SCNC: 100 MMOL/L (ref 98–107)
CHLORIDE SERPL-SCNC: 99 MMOL/L (ref 98–107)
CHLORIDE SERPL-SCNC: 99 MMOL/L (ref 98–107)
CREAT SERPL-MCNC: 1.12 MG/DL (ref 0.67–1.17)
CREAT SERPL-MCNC: 1.15 MG/DL (ref 0.67–1.17)
CREAT SERPL-MCNC: 1.16 MG/DL (ref 0.67–1.17)
DEPRECATED HCO3 PLAS-SCNC: 21 MMOL/L (ref 22–29)
DEPRECATED HCO3 PLAS-SCNC: 23 MMOL/L (ref 22–29)
DEPRECATED HCO3 PLAS-SCNC: 23 MMOL/L (ref 22–29)
EOSINOPHIL # BLD AUTO: 0.1 10E3/UL (ref 0–0.7)
EOSINOPHIL NFR BLD AUTO: 1 %
ERCP: NORMAL
ERYTHROCYTE [DISTWIDTH] IN BLOOD BY AUTOMATED COUNT: 12.1 % (ref 10–15)
ERYTHROCYTE [DISTWIDTH] IN BLOOD BY AUTOMATED COUNT: 12.2 % (ref 10–15)
ERYTHROCYTE [DISTWIDTH] IN BLOOD BY AUTOMATED COUNT: 12.3 % (ref 10–15)
GFR SERPL CREATININE-BSD FRML MDRD: 74 ML/MIN/1.73M2
GFR SERPL CREATININE-BSD FRML MDRD: 75 ML/MIN/1.73M2
GFR SERPL CREATININE-BSD FRML MDRD: 77 ML/MIN/1.73M2
GLUCOSE BLDC GLUCOMTR-MCNC: 192 MG/DL (ref 70–99)
GLUCOSE BLDC GLUCOMTR-MCNC: 246 MG/DL (ref 70–99)
GLUCOSE BLDC GLUCOMTR-MCNC: 305 MG/DL (ref 70–99)
GLUCOSE BLDC GLUCOMTR-MCNC: 308 MG/DL (ref 70–99)
GLUCOSE BLDC GLUCOMTR-MCNC: 359 MG/DL (ref 70–99)
GLUCOSE SERPL-MCNC: 262 MG/DL (ref 70–99)
GLUCOSE SERPL-MCNC: 322 MG/DL (ref 70–99)
GLUCOSE SERPL-MCNC: 346 MG/DL (ref 70–99)
HCT VFR BLD AUTO: 36.2 % (ref 40–53)
HCT VFR BLD AUTO: 36.8 % (ref 40–53)
HCT VFR BLD AUTO: 39.8 % (ref 40–53)
HGB BLD-MCNC: 12.2 G/DL (ref 13.3–17.7)
HGB BLD-MCNC: 12.2 G/DL (ref 13.3–17.7)
HGB BLD-MCNC: 13 G/DL (ref 13.3–17.7)
HOLD SPECIMEN: NORMAL
IMM GRANULOCYTES # BLD: 0.1 10E3/UL
IMM GRANULOCYTES NFR BLD: 1 %
INR PPP: 1.4 (ref 0.85–1.15)
INR PPP: 1.49 (ref 0.85–1.15)
LACTATE SERPL-SCNC: 1.9 MMOL/L (ref 0.7–2)
LACTATE SERPL-SCNC: 2.6 MMOL/L (ref 0.7–2)
LIPASE SERPL-CCNC: 27 U/L (ref 13–60)
LYMPHOCYTES # BLD AUTO: 0.9 10E3/UL (ref 0.8–5.3)
LYMPHOCYTES NFR BLD AUTO: 7 %
MCH RBC QN AUTO: 29.7 PG (ref 26.5–33)
MCH RBC QN AUTO: 29.7 PG (ref 26.5–33)
MCH RBC QN AUTO: 30 PG (ref 26.5–33)
MCHC RBC AUTO-ENTMCNC: 32.7 G/DL (ref 31.5–36.5)
MCHC RBC AUTO-ENTMCNC: 33.2 G/DL (ref 31.5–36.5)
MCHC RBC AUTO-ENTMCNC: 33.7 G/DL (ref 31.5–36.5)
MCV RBC AUTO: 89 FL (ref 78–100)
MCV RBC AUTO: 90 FL (ref 78–100)
MCV RBC AUTO: 91 FL (ref 78–100)
MONOCYTES # BLD AUTO: 0.8 10E3/UL (ref 0–1.3)
MONOCYTES NFR BLD AUTO: 6 %
NEUTROPHILS # BLD AUTO: 10.7 10E3/UL (ref 1.6–8.3)
NEUTROPHILS NFR BLD AUTO: 85 %
NRBC # BLD AUTO: 0 10E3/UL
NRBC BLD AUTO-RTO: 0 /100
PLATELET # BLD AUTO: 198 10E3/UL (ref 150–450)
PLATELET # BLD AUTO: 202 10E3/UL (ref 150–450)
PLATELET # BLD AUTO: 215 10E3/UL (ref 150–450)
POTASSIUM SERPL-SCNC: 4.6 MMOL/L (ref 3.4–5.3)
POTASSIUM SERPL-SCNC: 4.7 MMOL/L (ref 3.4–5.3)
POTASSIUM SERPL-SCNC: 5 MMOL/L (ref 3.4–5.3)
PROT SERPL-MCNC: 6.3 G/DL (ref 6.4–8.3)
PROT SERPL-MCNC: 6.9 G/DL (ref 6.4–8.3)
PROT SERPL-MCNC: 7.1 G/DL (ref 6.4–8.3)
RADIOLOGIST FLAGS: ABNORMAL
RADIOLOGIST FLAGS: ABNORMAL
RBC # BLD AUTO: 4.06 10E6/UL (ref 4.4–5.9)
RBC # BLD AUTO: 4.11 10E6/UL (ref 4.4–5.9)
RBC # BLD AUTO: 4.38 10E6/UL (ref 4.4–5.9)
SODIUM SERPL-SCNC: 132 MMOL/L (ref 136–145)
SODIUM SERPL-SCNC: 134 MMOL/L (ref 136–145)
SODIUM SERPL-SCNC: 136 MMOL/L (ref 136–145)
WBC # BLD AUTO: 12.5 10E3/UL (ref 4–11)
WBC # BLD AUTO: 12.5 10E3/UL (ref 4–11)
WBC # BLD AUTO: 13.5 10E3/UL (ref 4–11)

## 2023-03-22 PROCEDURE — 258N000003 HC RX IP 258 OP 636: Performed by: STUDENT IN AN ORGANIZED HEALTH CARE EDUCATION/TRAINING PROGRAM

## 2023-03-22 PROCEDURE — 83690 ASSAY OF LIPASE: CPT

## 2023-03-22 PROCEDURE — 74018 RADEX ABDOMEN 1 VIEW: CPT

## 2023-03-22 PROCEDURE — 85027 COMPLETE CBC AUTOMATED: CPT | Performed by: STUDENT IN AN ORGANIZED HEALTH CARE EDUCATION/TRAINING PROGRAM

## 2023-03-22 PROCEDURE — 99254 IP/OBS CNSLTJ NEW/EST MOD 60: CPT | Mod: GC | Performed by: SURGERY

## 2023-03-22 PROCEDURE — 36415 COLL VENOUS BLD VENIPUNCTURE: CPT | Performed by: STUDENT IN AN ORGANIZED HEALTH CARE EDUCATION/TRAINING PROGRAM

## 2023-03-22 PROCEDURE — 250N000011 HC RX IP 250 OP 636: Performed by: STUDENT IN AN ORGANIZED HEALTH CARE EDUCATION/TRAINING PROGRAM

## 2023-03-22 PROCEDURE — 85027 COMPLETE CBC AUTOMATED: CPT

## 2023-03-22 PROCEDURE — 83605 ASSAY OF LACTIC ACID: CPT | Performed by: STUDENT IN AN ORGANIZED HEALTH CARE EDUCATION/TRAINING PROGRAM

## 2023-03-22 PROCEDURE — 80053 COMPREHEN METABOLIC PANEL: CPT

## 2023-03-22 PROCEDURE — 74176 CT ABD & PELVIS W/O CONTRAST: CPT

## 2023-03-22 PROCEDURE — 84155 ASSAY OF PROTEIN SERUM: CPT | Performed by: STUDENT IN AN ORGANIZED HEALTH CARE EDUCATION/TRAINING PROGRAM

## 2023-03-22 PROCEDURE — 36415 COLL VENOUS BLD VENIPUNCTURE: CPT

## 2023-03-22 PROCEDURE — 84450 TRANSFERASE (AST) (SGOT): CPT | Performed by: STUDENT IN AN ORGANIZED HEALTH CARE EDUCATION/TRAINING PROGRAM

## 2023-03-22 PROCEDURE — 99233 SBSQ HOSP IP/OBS HIGH 50: CPT | Mod: FS | Performed by: STUDENT IN AN ORGANIZED HEALTH CARE EDUCATION/TRAINING PROGRAM

## 2023-03-22 PROCEDURE — 85610 PROTHROMBIN TIME: CPT | Performed by: STUDENT IN AN ORGANIZED HEALTH CARE EDUCATION/TRAINING PROGRAM

## 2023-03-22 PROCEDURE — 120N000002 HC R&B MED SURG/OB UMMC

## 2023-03-22 PROCEDURE — 74018 RADEX ABDOMEN 1 VIEW: CPT | Mod: 26 | Performed by: RADIOLOGY

## 2023-03-22 PROCEDURE — 250N000013 HC RX MED GY IP 250 OP 250 PS 637: Performed by: STUDENT IN AN ORGANIZED HEALTH CARE EDUCATION/TRAINING PROGRAM

## 2023-03-22 PROCEDURE — 83605 ASSAY OF LACTIC ACID: CPT

## 2023-03-22 PROCEDURE — 258N000003 HC RX IP 258 OP 636

## 2023-03-22 PROCEDURE — 99232 SBSQ HOSP IP/OBS MODERATE 35: CPT | Mod: GC | Performed by: INTERNAL MEDICINE

## 2023-03-22 PROCEDURE — 74176 CT ABD & PELVIS W/O CONTRAST: CPT | Mod: 26 | Performed by: RADIOLOGY

## 2023-03-22 PROCEDURE — 250N000012 HC RX MED GY IP 250 OP 636 PS 637: Performed by: STUDENT IN AN ORGANIZED HEALTH CARE EDUCATION/TRAINING PROGRAM

## 2023-03-22 RX ORDER — SODIUM CHLORIDE, SODIUM LACTATE, POTASSIUM CHLORIDE, CALCIUM CHLORIDE 600; 310; 30; 20 MG/100ML; MG/100ML; MG/100ML; MG/100ML
INJECTION, SOLUTION INTRAVENOUS CONTINUOUS
Status: DISCONTINUED | OUTPATIENT
Start: 2023-03-22 | End: 2023-03-23

## 2023-03-22 RX ADMIN — ALLOPURINOL 100 MG: 100 TABLET ORAL at 08:22

## 2023-03-22 RX ADMIN — OXYCODONE HYDROCHLORIDE 10 MG: 5 TABLET ORAL at 08:43

## 2023-03-22 RX ADMIN — OXYCODONE HYDROCHLORIDE 10 MG: 5 TABLET ORAL at 12:51

## 2023-03-22 RX ADMIN — ACETAMINOPHEN 975 MG: 325 TABLET ORAL at 19:56

## 2023-03-22 RX ADMIN — HYDROMORPHONE HYDROCHLORIDE 0.5 MG: 1 INJECTION, SOLUTION INTRAMUSCULAR; INTRAVENOUS; SUBCUTANEOUS at 05:21

## 2023-03-22 RX ADMIN — SODIUM CHLORIDE, POTASSIUM CHLORIDE, SODIUM LACTATE AND CALCIUM CHLORIDE 1000 ML: 600; 310; 30; 20 INJECTION, SOLUTION INTRAVENOUS at 00:09

## 2023-03-22 RX ADMIN — INSULIN ASPART 4 UNITS: 100 INJECTION, SOLUTION INTRAVENOUS; SUBCUTANEOUS at 08:33

## 2023-03-22 RX ADMIN — ACETAMINOPHEN 975 MG: 325 TABLET ORAL at 14:30

## 2023-03-22 RX ADMIN — PIPERACILLIN AND TAZOBACTAM 4.5 G: 4; .5 INJECTION, POWDER, FOR SOLUTION INTRAVENOUS at 08:22

## 2023-03-22 RX ADMIN — PIPERACILLIN AND TAZOBACTAM 4.5 G: 4; .5 INJECTION, POWDER, FOR SOLUTION INTRAVENOUS at 01:53

## 2023-03-22 RX ADMIN — INSULIN ASPART 4 UNITS: 100 INJECTION, SOLUTION INTRAVENOUS; SUBCUTANEOUS at 12:01

## 2023-03-22 RX ADMIN — HYDROMORPHONE HYDROCHLORIDE 0.5 MG: 1 INJECTION, SOLUTION INTRAMUSCULAR; INTRAVENOUS; SUBCUTANEOUS at 15:43

## 2023-03-22 RX ADMIN — METOPROLOL SUCCINATE 25 MG: 25 TABLET, EXTENDED RELEASE ORAL at 08:22

## 2023-03-22 RX ADMIN — INSULIN GLARGINE 15 UNITS: 100 INJECTION, SOLUTION SUBCUTANEOUS at 10:40

## 2023-03-22 RX ADMIN — HYDROMORPHONE HYDROCHLORIDE 0.5 MG: 1 INJECTION, SOLUTION INTRAMUSCULAR; INTRAVENOUS; SUBCUTANEOUS at 18:45

## 2023-03-22 RX ADMIN — HYDROMORPHONE HYDROCHLORIDE 0.5 MG: 1 INJECTION, SOLUTION INTRAMUSCULAR; INTRAVENOUS; SUBCUTANEOUS at 21:43

## 2023-03-22 RX ADMIN — PIPERACILLIN AND TAZOBACTAM 4.5 G: 4; .5 INJECTION, POWDER, FOR SOLUTION INTRAVENOUS at 14:26

## 2023-03-22 RX ADMIN — ACETAMINOPHEN 975 MG: 325 TABLET ORAL at 08:22

## 2023-03-22 RX ADMIN — OXYCODONE HYDROCHLORIDE 5 MG: 5 TABLET ORAL at 00:08

## 2023-03-22 RX ADMIN — PIPERACILLIN AND TAZOBACTAM 4.5 G: 4; .5 INJECTION, POWDER, FOR SOLUTION INTRAVENOUS at 19:57

## 2023-03-22 RX ADMIN — SODIUM CHLORIDE, POTASSIUM CHLORIDE, SODIUM LACTATE AND CALCIUM CHLORIDE: 600; 310; 30; 20 INJECTION, SOLUTION INTRAVENOUS at 17:54

## 2023-03-22 ASSESSMENT — ACTIVITIES OF DAILY LIVING (ADL)
ADLS_ACUITY_SCORE: 24
ADLS_ACUITY_SCORE: 20
ADLS_ACUITY_SCORE: 24
ADLS_ACUITY_SCORE: 20
ADLS_ACUITY_SCORE: 20
ADLS_ACUITY_SCORE: 24

## 2023-03-22 NOTE — ANESTHESIA POSTPROCEDURE EVALUATION
Patient: Slick Lizarraga    Procedure: Procedure(s):  Endoscopic Retrograde Cholangiopancreatography, Exchange Tube/Stent, Sludge removal, balloon dilation       Anesthesia Type:  General    Note:  Disposition: Inpatient   Postop Pain Control: Uneventful            Sign Out: Well controlled pain   PONV: No   Neuro/Psych: Uneventful            Sign Out: Acceptable/Baseline neuro status   Airway/Respiratory: Uneventful            Sign Out: Acceptable/Baseline resp. status   CV/Hemodynamics: Uneventful            Sign Out: Acceptable CV status; No obvious hypovolemia; No obvious fluid overload   Other NRE: NONE   DID A NON-ROUTINE EVENT OCCUR? No           Last vitals:  Vitals Value Taken Time   /80 03/21/23 2115   Temp 36.1  C (97  F) 03/21/23 2110   Pulse 89 03/21/23 2116   Resp 16 03/21/23 2045   SpO2 96 % 03/21/23 2122   Vitals shown include unvalidated device data.    Electronically Signed By: Pierce Sandoval MD  March 21, 2023  10:33 PM

## 2023-03-22 NOTE — PLAN OF CARE
Neuro: Alert and oriented x4, forgetful  Cardiac: VSS stable on RA  Respiratory: Difficulty with deep breaths  GI/: Voids independently not saving, denies nausea. Rounded, passing flatus no BM  Diet/Appetite: Regular diet, adequate appetite  Activity: Up with 1 assist  Pain: Pain 5/10, managed with oxycodone.   Skin: Jaundice  Lines: 2 Peripheral IV's on right forearm, saline locked  Drains: None   Replacement: Potassium and mangensium standard replacement orders not given, labs within range

## 2023-03-22 NOTE — PLAN OF CARE
Goal Outcome Evaluation:  Slick continues to have LRQ pain. Requesting Oxycodone 10mh every 4 hours.  Said it feels like he has gas rolling around his abd.  Reports this am he had a 10/10 with what felt like air moving up to his shoulder.  + gas but no BM this am.  Took 2 Senna last evening and wanted to wait on Miralax this am. Eating ok - small frequent meals.  Denies nausea or pain associated with eating.  Up with SBA of 1 and bed alarm is on.

## 2023-03-22 NOTE — ANESTHESIA PROCEDURE NOTES
Airway       Patient location during procedure: OR       Procedure Start/Stop Times: 3/21/2023 7:13 PM  Staff -        CRNA: Thelma Uribe APRN CRNA       Performed By: CRNA  Consent for Airway        Urgency: elective  Indications and Patient Condition       Indications for airway management: [place]-procedural       Induction type:intravenous       Mask difficulty assessment: 2 - vent by mask + OA or adjuvant +/- NMBA    Final Airway Details       Final airway type: endotracheal airway       Successful airway: ETT - single  Endotracheal Airway Details        ETT size (mm): 7.5       Cuffed: yes       Successful intubation technique: direct laryngoscopy       DL Blade Type: MAC 3       Grade View of Cords: 2       Adjucts: stylet       Position: Right       Measured from: lips       Secured at (cm): 24       Bite block used: None (GI bite block)    Post intubation assessment        Placement verified by: capnometry, equal breath sounds and chest rise        Number of attempts at approach: 1       Secured with: other (comment) (tube tamer)       Ease of procedure: easy       Dentition: Unchanged    Medication(s) Administered   Medication Administration Time: 3/21/2023 7:13 PM

## 2023-03-22 NOTE — PROGRESS NOTES
[facility]    Progress Note - Medicine Service, [facility] TEAM 1       Date of Admission:  3/21/2023    Assessment & Plan   Slick Lizarraga is a 57 year old man with a history of recently diagnosed pancreatic adenocarcinoma, biliary stent placement 3/14/23, HTN, type 2 diabetes, gout, and DAYAMI who presents with epigastric abdominal pain for 12 hours found to have severe sepsis presumed secondary to cholangitis now s/p bilia     Today  - Abdominal XR per GI recommendation given abdominal and shoulder pain   - Follow-up blood cultures  - Discontinue fluids due to adequate PO fluid intake  - Glargine 15 U QAM    Severe sepsis secondary to presumed cholangitis due to biliary stent obstruction  Pancreatic adenocarcinoma with obstruction s/p biliary stent (3/14/23)   Presented with 12 hours of epigastric abdominal pain subsequently generalized to the upper abdomen bilaterally with radiation to the right upper shoulder in the context of recent biliary stent placement. He had lactic acidosis, leukocytosis and underwent emergent ERCP which showed obstruction of stent with food material and sludge/stone which was removed. He was treated IV zosyn and blood cultures remain no growth to date. Currently abdominal pain persists but he has improved conjunctival icterus. Currently hemodynamically stable.   - GI panc-bili consult - appreciate recommendations              - Zosyn 4.5 g Q6H               - Follow blood cultures   - Abdominal XR due to continued abdominal pain   - Pain control:              - Tylenol 975 mg scheduled TID              - Oxycodone 5-10 Q4H prn               - Dilaudid IV 0.5 mg Q4H prn   - Antiemetics:               >Ondansetron 4 mg Q6H PRN              >Prochlorperazine 10 mg Q6H PRN  - Discontinue fluids due to adequate PO fluid intake  - Lactate normalized with fluids overnight 3/22     Type 2 Diabetes (A1c 8.1%) with hyperglycemia  Home regimen: metformin,  "glipizide and semaglutide. Continues to be persistently hyperglycemic to 300s.    - Glargine 15 U QAM  - Medium sliding scale insulin   - ACHS glucose checks   - Hypoglycemia protocol  - Hold PTA metformin 1 g daily in setting of sepsis  - Hold PTA semaglutide 2 mg daily and glipizide 10 mg daily given risk for hypoglycemia     0.9 cm low-attenuation focus in the left lobe of the liver, unknown etiology, stable  Found on CT scan, it is nonspecific and not significantly changed from prior study. Ddx includes metastasis.   - Consider MR of liver outpatient for further evaluation     Chronic medical issues:   HTN: Metoprolol succinate 25 mg daily  Gout: Allopurinol 100 mg daily  Insomnia: Melatonin 1 mg QPM PRN      Diet: Regular Diet Adult    DVT Prophylaxis: Enoxaparin  Kilpatrick Catheter: Not present  Fluids:  mL/hr  Lines: None     Cardiac Monitoring: None  Code Status: Full Code      Clinically Significant Risk Factors           # Hypercalcemia: Highest Ca = 10.2 mg/dL in last 2 days, will monitor as appropriate    # Hypoalbuminemia: Lowest albumin = 3.2 g/dL at 3/22/2023  1:56 AM, will monitor as appropriate          # DMII: A1C = 8.1 % (Ref range: <5.7 %) within past 6 months, PRESENT ON ADMISSION  # Obesity: Estimated body mass index is 31.97 kg/m  as calculated from the following:    Height as of this encounter: 1.778 m (5' 10\").    Weight as of this encounter: 101.1 kg (222 lb 12.8 oz)., PRESENT ON ADMISSION         Disposition Plan     Expected Discharge Date: 03/23/2023                The patient's care was discussed with the Attending Physician, Dr. Lillie Alaniz.    Xavier Riggs  Medical Student    Resident/Fellow Attestation   I, Jaylon Pugh, was present with the medical student who participated in the service and in the documentation of the note.  I have verified the history and personally performed the physical exam and medical decision making.  I agree with the assessment and plan of care " "as documented in the note.      Jaylon Pugh MD  Internal Medicine, PGY-3    Date of Service (when I saw the patient): 03/22/23    Medicine Service, [facility] TEAM 1  [facility]  Securely message with [employer] (more info)  Text page via [facility] Paging/Directory   See signed in provider for up to date coverage information  ______________________________________________________________________    Interval History   NAEO. ERCP w/ biliary stent dilation performed yesterday (3/21/23). Experienced two discrete pain episodes over night, with the second described as \"the worst pain of [his] life\". Onset of pain occurred over several hours and culminated in pain along the entire right side of the abdomen. Pain rapidly resolved with IV Dilaudid. Also had lactic acid to 3.2 --> resolved to 1.9 w/ 1 L fluid.    This morning he is doing well, no nausea or vomiting. No pain. He feels okay walking to the bathroom.     Physical Exam   Vital Signs: Temp: 98.3  F (36.8  C) Temp src: Oral BP: 101/57 Pulse: 75   Resp: 17 SpO2: 96 % O2 Device: None (Room air) Oxygen Delivery: 2 LPM  Weight: 222 lbs 12.8 oz    Constitutional: sitting up in bed in no acute distress, conversant and pleasant   HEENT: conjunctival icterus present, improved from prior  Respiratory: No increased work of breathing, good air exchange, clear to auscultation bilaterally, no crackles or wheezing  Cardiovascular: normal rate, regular rhythm   GI: Normal bowel sounds, soft, non-distended, tender in R inguinal crease, RLQ, and RUQ  Skin: Jaundice noted  MSK: no lower extremity edema  Neurologic: Awake, alert, oriented to person place time and situation. Able to state president.     Data     I have personally reviewed the following data over the past 24 hrs:    12.5 (H)  \   12.2 (L)   / 202     134 (L) 99 25.7 (H) /  305 (H)   4.7 23 1.15 \       ALT: 76 (H) AST: 46 AP: 186 (H) TBILI: 4.1 (H)   ALB: 3.5 TOT PROTEIN: 6.9 " LIPASE: 27       Procal: N/A CRP: N/A Lactic Acid: 1.9       INR:  1.40 (H) PTT:  N/A   D-dimer:  N/A Fibrinogen:  N/A       Imaging results reviewed over the past 24 hrs:   Recent Results (from the past 24 hour(s))   XR Chest 2 Views    Narrative    Exam: XR CHEST 2 VIEWS, 3/21/2023 2:16 PM    Comparison: CT abdomen and pelvis 03/21/2023 and CT chest abdomen and  pelvis 03/14/2023    History: Evaluate intrapulmonary source of infection    Findings:  PA and lateral views of the chest. Trachea is midline.  Cardiomediastinal silhouette is within normal limits. Mild streaky  bibasilar atelectasis. No focal consolidation. There is no  pneumothorax or pleural effusion. Partially visualized biliary stent.  No acute osseous abnormality. Degenerative changes throughout the  visualized spine and severe degenerative changes of the bilateral  acromioclavicular joint spaces..      Impression    Impression:   Mild streaky bibasilar atelectasis. No focal consolidation.    I have personally reviewed the examination and initial interpretation  and I agree with the findings.    GREG DELUCA MD         SYSTEM ID:  [id number]   XR Surgery CRISPIN L/T 5 Min Fluoro w Stills    Narrative    This exam was marked as non-reportable because it will not be read by a   radiologist or a [place] non-radiologist provider.

## 2023-03-22 NOTE — PLAN OF CARE
Goal Outcome Evaluation:    [id number]    VSS on RA.  & 359. Provider aware. 4 units novolog given at HS. A&Ox4 but very forgetful. Did not remember calling his wife when he got up to the unit and forgot lab came and chung labs a few times overnight. Needs frequent reminders about the same topics. Urine is brown and urine output is low.   UA sent and provider notified of results. LBM 3/20 per pt report. Scheduled senna without result. Scheduled lactic acid check was 3.2. 1L LR bolus given and LA rechecks were 2.6 & 1.9. Pt is unsteady on his feet. Bed alarm on for safety overnight but pt is calling appropriately. Ate cereal and PB&J and tolerated well.     Pt is having consistant R side abdominal pain 4/10 that was managed with 5mg oxy x1.  2am- Pt had an episode when he woke up and had 10/10 sharp/stabbing LUQ abdominal pain. Pt described this pain as new and different than pain he had before. Provider was updated and ordered labs. Pain resolved without intervention in about 5 minutes. Pt declined pain medications.  5am- pt had another episode of R side, stabbing, abd pain that radiated up to his shoulders and back. Resolved with IV dilaudid. Provider saw at bedside.

## 2023-03-22 NOTE — PROGRESS NOTES
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     CT imaging was negative for any stent migration.  Clinical picture was concerning for developing cholangitis, pt was started on antibiotics and an ERCP was done on 3/21/23, copious amounts of sludge was swept from the duct, stent was dialted to upto 8 mm.     Overnight pt reportedly complained of intermittent abd pain that was radiating to the shoulders. This AM he reported having intermittent right lower quadrant pain.  Xray abdomen was obtained as a next step and this showed evidence of gas within the gallbladder wall concerning for emphysematous cholecystitis.       Recommendations  - CT abdomen without contrast as suggested by radiology to evaluate for presence of cholecystitis   - Surgical oncology consult (discussed with his outpatient surgical oncologist Dr. Farooq)   - Continue Zosyn     Pt care plan discussed with Dr. Yeni Rojas MD   Fellow   Gastroenterology & Hepatology     __________________________________________________________________________________  Subjective:  Nursing notes reviewed and noted.  Complained of abd pain radiating to both shoulders throughout last night   This AM he reported having right lower quadrant abd pain  Afebrile       Physical Examination:  Vital Signs:  /64 (BP Location: Left arm)   Pulse 74   Temp 98.7  F (37.1  C) (Oral)   Resp 16   Ht 1.778 m (5' 10\")   Wt 101.1 kg (222 lb 12.8 oz)   SpO2 97%   BMI 31.97 kg/m   "     General:  Pt in no acute distress  HEENT:  Sclera icteric  Neck:  Supple.   Chest: Non labored breathing  Cardiovascular: RRR.   Abdomen:  Soft, tenderness in epigastric, right upper quadrant and right lower quadrant    Skin:  Jaundice   Neurological:  Alert, calm and oriented     DATA:  Labs:    Reviewed and noted

## 2023-03-22 NOTE — CONSULTS
Care Management Initial Consult    General Information  ,    Type of CM/SW Visit: Initial Assessment    Primary Care Provider verified and updated as needed: Yes   Readmission within the last 30 days: no previous admission in last 30 days, current reason for admission unrelated to previous admission   Return Category: New Diagnosis     Advance Care Planning: Advance Care Planning Reviewed: other (see comments) (Full Code)          Communication Assessment  Patient's communication style: spoken language (English or Bilingual)    Hearing Difficulty or Deaf: no   Wear Glasses or Blind: no    Cognitive  Cognitive/Neuro/Behavioral: .WDL except  Level of Consciousness: alert  Arousal Level: opens eyes spontaneously  Orientation: oriented x 4, other (see comments) (forgetful)  Mood/Behavior: calm, cooperative     Speech: clear, spontaneous    Living Environment:   People in home: spouse, child(maribel), dependent     Current living Arrangements: house      Able to return to prior arrangements: yes       Family/Social Support:  Care provided by: spouse/significant other  Provides care for:    Marital Status:   Wife, Children          Description of Support System: Supportive, Involved         Current Resources:   Patient receiving home care services: No     Community Resources:    Equipment currently used at home: none      Employment/Financial:     Financial Concerns: No concerns identified           Lifestyle & Psychosocial Needs:  Social Determinants of Health     Tobacco Use: High Risk     Smoking Tobacco Use: Never     Smokeless Tobacco Use: Current     Passive Exposure: Not on file   Alcohol Use: Not on file   Financial Resource Strain: Not on file   Food Insecurity: Not on file   Transportation Needs: Not on file   Physical Activity: Not on file   Stress: Not on file   Social Connections: Not on file   Intimate Partner Violence: Not on file   Depression: Not on file   Housing Stability: Not on file        Functional Status:  Prior to admission patient needed assistance: no       Mental Health Status:  Mental Health Status: No Current Concerns       Chemical Dependency Status:  Chemical Dependency Status: No Current Concerns             Values/Beliefs:  Spiritual, Cultural Beliefs, Moravian Practices, Values that affect care: no               Additional Information:  Patient has new diagnosis of pancreatic adenocarcinoma (has not started chemo yet). Admitted for abdominal pain, upon admission developed altered mental status and there was concern for cholangitis. GI consulted and patient started on antibiotics, had ERCP with stent exchange on 3/21.     Patient continues to be monitored and course of treatment is being assessed. Hopeful that new stent will help improve symptoms and no further surgical treatment will be required so patient can start chemo as soon as possible. If patient does become acutely ill a perc demetri tube may likely be next step.     OT/PT evals pending.     Care coordination will continue to follow for dc needs.     Meka Baez RN   Care Coordinator

## 2023-03-22 NOTE — PROGRESS NOTES
"CLINICAL NUTRITION SERVICES - ASSESSMENT NOTE     Nutrition Prescription    RECOMMENDATIONS FOR MDs/PROVIDERS TO ORDER:  None at this time.     Malnutrition Status:    Unable to determine due to unable to complete NFPE    Recommendations already ordered by Registered Dietitian (RD):  Ensure Enlive, chocolate, at lunch   Order for additional supplements PRN.    Future/Additional Recommendations:  Monitor weight and intake trends.     REASON FOR ASSESSMENT  Slick Lizarraga is a/an 57 year old male assessed by the dietitian for Provider Order - Assess malnutrition    NUTRITION HISTORY  Spoke with patient. Patient reports recent weight loss. States that his appetite was decreased for some time recently but has somewhat improved. Provided patient with supplement list. Patient stated he does not like Boost but will drink it if he has too, has not recently tried Ensure and is willing to give it a try today. Visit was shortened due to transportation arriving for CT.    PMHx   Per H&P: \"PMH of recently diagnosed pancreatic adenocarcinoma, biliary stent placement 3/14/23, HTN, type 2 diabetes, gout, and DAYAMI who presents with epigastric abdominal pain for 12 hours, found to have elevated liver tests and leukocytosis admitted for further workup and pain management.\", \"He was recently diagnosed with pancreatic adenocarcinoma and has not yet begun chemotherapy.\", \"He also notes a low appetite for the past 6-9 months\"    CURRENT NUTRITION ORDERS  Diet: Regular  Intake/Tolerance: Patient reports recent decreased appetite but did not elaborate on quantity/volume of meals.      LABS  Labs reviewed   03/22/23 01:56 03/22/23 02:49 03/22/23 04:06 03/22/23 06:14   Sodium 132 (L)   134 (L)   Potassium 5.0   4.7   GFR Estimate 77   75   Calcium 9.0   9.4   Anion Gap 11   12   Albumin 3.2 (L)   3.5   Protein Total 6.3 (L)   6.9   WBC  13.5 (H) 12.5 (H)    Hemoglobin  12.2 (L) 12.2 (L)    MCV  89 90      MEDICATIONS  Medications " "reviewed  Insulin Aspart, Insulin Glargine, LR bolus 1,000 mL today, Zosyn, Miralax, Senokot, Dilaudid, Oxycodone.     ANTHROPOMETRICS  Height: 177.8 cm (5' 10\")  Most Recent Weight: 101.1 kg (222 lb 12.8 oz)    IBW: 75.5 kg  BMI: 31.97 - Obesity Grade I BMI 30-34.9  Weight History: 21.7% weight loss in 11 months.   Wt Readings from Last Encounters:   03/22/23 101.1 kg (222 lb 12.8 oz)   03/20/23 97.8 kg (215 lb 11.2 oz)   03/14/23 101.8 kg (224 lb 6.9 oz)   02/22/23 -  kg (247 lb)   11/15/22 -  kg (248 lb 12.8 oz)   09/20/22 -  kg (258 lb)   07/13/22 -  kg (255 lb 12.8 oz)   04/26/22 -  kg (271 lb 4.8 oz)   03/15/22 -  kg (270 lb)     Dosing Weight: 82 kg - Adjusted     ASSESSED NUTRITION NEEDS  Estimated Energy Needs: 1592-7260 kcals/day (30 - 35 kcals/kg )  Justification: Increased needs with adenocarcinoma  Estimated Protein Needs:  grams protein/day (1.1 - 1.4 grams of pro/kg)  Justification: Increased needs  Estimated Fluid Needs: 1 mL/kcal   Justification: Maintenance    PHYSICAL FINDINGS  See malnutrition section below.    MALNUTRITION  % Intake: Decreased intake does not meet criteria  % Weight Loss: > 20% in 1 year (severe)  Subcutaneous Fat Loss: Unable to assess  Muscle Loss: Unable to assess  Fluid Accumulation/Edema: None noted  Malnutrition Diagnosis: Unable to determine due to unable to complete NFPE.     NUTRITION DIAGNOSIS  Predicted inadequate nutrient intake related to decreased appetite and early satiety as evidenced by weight loss of 21.7% in 11 months.       INTERVENTIONS  Implementation  Ensure Enlive, chocolate, at lunch   Order for additional supplements PRN.     Goals  Patient to consume % of nutritionally adequate meal trays TID, or the equivalent with supplements/snacks.     Monitoring/Evaluation  Progress toward goals will be monitored and evaluated per protocol.    Jenn Infante RD, LD   6A/7D pager [phone]  Weekend pager " [phone]

## 2023-03-22 NOTE — ANESTHESIA CARE TRANSFER NOTE
Patient: Slick Lizarraga    Procedure: Procedure(s):  Endoscopic Retrograde Cholangiopancreatography, Exchange Tube/Stent, Sludge removal, balloon dilation       Diagnosis: Cholangitis [K83.09]  Diagnosis Additional Information: No value filed.    Anesthesia Type:   General     Note:    Oropharynx: oropharynx clear of all foreign objects and spontaneously breathing  Level of Consciousness: drowsy  Oxygen Supplementation: face mask  Level of Supplemental Oxygen (L/min / FiO2): 10  Independent Airway: airway patency satisfactory and stable  Dentition: dentition unchanged  Vital Signs Stable: post-procedure vital signs reviewed and stable  Report to RN Given: handoff report given  Patient transferred to: PACU    Handoff Report: Identifed the Patient, Identified the Reponsible Provider, Reviewed the pertinent medical history, Discussed the surgical course, Reviewed Intra-OP anesthesia mangement and issues during anesthesia, Set expectations for post-procedure period and Allowed opportunity for questions and acknowledgement of understanding      Vitals:  Vitals Value Taken Time   /99 03/21/23 2012   Temp     Pulse 98 03/21/23 2018   Resp 16    SpO2 100 % 03/21/23 2018   Vitals shown include unvalidated device data.    Electronically Signed By: Thelma Uribe CRNA, APRN CRNA  March 21, 2023  8:19 PM

## 2023-03-22 NOTE — CONSULTS
INTERVENTIONAL RADIOLOGY CONSULT NOTE    Reason for referral:   Percutaneous cholecystostomy drain placement    History:   57 year old male with a history of HTN, DAYAMI, DM, and recently diagnosed adenocarcinoma of the pancreatic head. He underwent ERCP and CBD stenting on 3/14, ERCP repeated 3/21 and found to have emphysematous cholecystitis.  There was no opacification of the gallbladder with contrast during ERCP.    At the time of exam patient has 4/10 right hypochondrial tenderness. Patient states this was previously a 10 /10 in the morning but has since sihgnificantly decreased. Patient also states that intermittently he will get pain in the epigastric region radiating to his left shoulder which goes away after 3 to 4 minutes.      Interventional radiology is consulted for placement of percutaneous cholecystostomy drain placement.      Labs:  Lab Results   Component Value Date    HGB 13.0 03/22/2023     Lab Results   Component Value Date     03/22/2023     Lab Results   Component Value Date    WBC 12.5 03/22/2023       Lab Results   Component Value Date    INR 1.49 03/22/2023       Lab Results   Component Value Date    PROTTOTAL 7.1 03/22/2023      Lab Results   Component Value Date    ALBUMIN 3.3 03/22/2023     Lab Results   Component Value Date    BILITOTAL 4.0 03/22/2023     No results found for: BILICONJ   Lab Results   Component Value Date    ALKPHOS 169 03/22/2023     Lab Results   Component Value Date    AST  03/22/2023      Comment:      Unsatisfactory specimen - hemolyzed   Specimen is hemolyzed which can falsely elevate AST. Analysis of a non-hemolyzed specimen may result in a lower value.     Lab Results   Component Value Date    ALT 72 03/22/2023       Lab Results   Component Value Date    CR 1.16 03/22/2023     Lab Results   Component Value Date    BUN 28.8 03/22/2023       Imaging:   CT scan demonstrated emphysematous cholecystitis with pneumobilia. The gallbladder is distended with  internal debris and sludge.  This is amenable to percutaneous transhepatic cholecystostomy and drain placement.    Assessment:   57-year-old male with recent diagnosis of pancreatic adenocarcinoma.  Patient found to have emphysematous cholecystitis s/p ERCP yesterday. The ERCP showed patent stent, but no contrast in the cystic duct to gallbladder.  Patient is stable and on antibiotics, however, his white count remains elevated.  His pain is improved, and his vital signs are stable. No other signs of sepsis.  He is a reasonable candidate for percutaneous cholecystostomy tube placement. Patient is currently not appropriately n.p.o. for the procedure, which requires sedation.  Given current clinical stability, plan for percutaneous cholecystostomy tomorrow morning.  If the patient clinically declines, IR will place the drain emergently. The primary  team and surgery team have been updated and are in agreement with the stated plan.      Plan:   -Percutaneous cholecystostomy drain placement tomorrow morning tentatively at 8 AM, sooner if clinically indicated.  -N.p.o. overnight.    George SONI  Fellow  Interventional Radiology  Pager: [phone]    I personally interviewed and examined this patient and agree with the assessment and plan documented by Dr. Dixon.  I personally discussed the plan with Dr. Angela from the surgery service.    It was a pleasure to meet Mr. Lizarraga today.  Thank you for involving the interventional radiology service in his care.  I spent a total of 30 minutes face-to-face time with this patient.    Lola Chand MD  Interventional Radiology   Pager [phone]

## 2023-03-22 NOTE — PHARMACY-ADMISSION MEDICATION HISTORY
Admission Medication History Completed by Pharmacy    See [facility] Admission Navigator for allergy information, preferred outpatient pharmacy, prior to admission medications and immunization status.     Medication History Sources:     Fill history    Patient interview    Changes made to PTA medication list (reason):    Added: None    Deleted:   o Amlodipine  o naloxone    Changed:   o ozempic taken on sunday    Additional Information:    Patient noted that he does not have testing supplies here and would need some testing supplies if the team wants him to test his blood sugars while staying in the hotel near the hospital    Prior to Admission medications    Medication Sig Last Dose Taking? Auth Provider Long Term End Date   acetaminophen (TYLENOL) 500 MG tablet Take 1,000 mg by mouth as needed Past Week at PRN Yes Reported, Patient     allopurinol (ZYLOPRIM) 100 MG tablet Take 1 tablet by mouth daily 3/20/2023 at AM Yes Reported, Patient  2/16/24   CONTOUR NEXT TEST test strip daily before breakfast  Yes Reported, Patient     glipiZIDE (GLUCOTROL) 10 MG tablet Take 10 mg by mouth 2 times daily (before meals) 3/20/2023 at PM Yes Reported, Patient Yes 11/15/23   hydrOXYzine (ATARAX) 10 MG tablet Take 10 mg by mouth 3 times daily as needed for itching Past Week at PRN Yes Reported, Patient     metFORMIN (GLUCOPHAGE) 1000 MG tablet Take 1 tablet by mouth 2 times daily (with meals) 3/20/2023 at PM Yes Reported, Patient Yes    metoprolol succinate ER (TOPROL XL) 25 MG 24 hr tablet Take 1 tablet by mouth daily 3/20/2023 at AM Yes Reported, Patient     Microlet Lancets MISC daily before breakfast Past Month Yes Reported, Patient     oxyCODONE (ROXICODONE) 5 MG tablet TAKE 1 TO 2 TABLETS BY MOUTH EVERY 4 HOURS AS NEEDED FOR PAIN FOR UP TO 3 DAYS 3/21/2023 at 0030 Yes Reported, Patient     semaglutide (OZEMPIC, 0.25 OR 0.5 MG/DOSE,) 2 MG/1.5ML SOPN pen Inject 0.5 mg Subcutaneous once a week On Adonis 3/12/2023 Yes Reported,  Patient     sildenafil (REVATIO) 20 MG tablet Take 20 mg by mouth 1-5 tablet one half hour to 4 hours before sexual activity. Do not exceed1 00mg in 24 hours More than a month at PRN Yes Reported, Patient Yes      Date completed: 03/22/23    Medication history completed by:     Jalil Hartley, PharmD  PGY1 Pharmacist Resident

## 2023-03-22 NOTE — PROVIDER NOTIFICATION
"Roberta Perez MD: \"Pt had a severe pain episode. Radiated into his shoulders and back. Pt was yelling in the bathroom, couldn't move, and stated it was the worst pain of his life. PRN dilaudid effective for now. Can you come assess?\"  "

## 2023-03-22 NOTE — PROGRESS NOTES
Surgical Oncology Attending Note    5pm labs are essentially unchanged despite antibiotics. Cystic duct did not opacify during ERCP, either from stent or from cholecystitis/inflammation. Despite obvious downsides of PTC (patient's distance to home, quality of life), I think PTC is the best option at this time to treat his gallbladder and get him to a state where he could start chemotherapy.    Medicine attending updated. My team has communicated with IR. PTC planned for tonight.

## 2023-03-22 NOTE — CONSULTS
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 He initially underwent ERCP with CBD stenting on 3/14. ERCP performed again yesterday in the setting of possible cholangitis with dilation of the previously placed stent. He also appears to have emphysematous cholecystitis, likely related to stent obstruction. Labs and abdominal pain slowly improving after procedure yesterday. Offered cholecystectomy urgently tonight vs close monitoring with antibiotics and NPO and patient elected to pursue non-operative management. If successful, this will allow him to start neoadjuvant treatment sooner.    - No plans for surgical intervention  - Continue IV antibiotics  - Agree with repeat CT ab/pelvis  - NPO, OK for ice chips and meds  - Anticipate 2-3 days of inpatient stay before he will tolerate PO  - If his pain worsens will readdress surgery this hospitalization    Seen with staff, Dr. 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 Patient had an initial ERCP with biliary stent placement on 3/14. He was seen in Surgical Oncology clinic on 3/20 for evaluation with plan for neoadjuvant treatment and follow-up in 3 months. After leaving clinic, patient had a hamburger and shortly after developed severe upper abdominal pain. He presented to the ED and was noted to have AMS with concern for cholangitis. GI performed repeat ERCP yesterday and dilated the biliary stent. Currently, he reports his abdominal pain is stable to slightly improved and his mental status  has cleared. His pain is in his upper abdomen, left worse than right with radiation to his back and shoulder. He has been afebrile. No nausea or vomiting.    Past Medical History:  Past Medical History:   Diagnosis Date     Diabetes (H)      Gout      Hypertension      Obstructive sleep apnea syndrome        Past Surgical History  Past Surgical History:   Procedure Laterality Date     ENDOSCOPIC RETROGRADE CHOLANGIOPANCREATOGRAM N/A 3/14/2023    Procedure: ENDOSCOPIC RETROGRADE CHOLANGIOPANCREATOGRAPHY with biliary and  pancreatic spinchterotomy, biliary stent placement;  Surgeon: Ray Roman MD;  Location: [facility] OR     ENDOSCOPIC RETROGRADE CHOLANGIOPANCREATOGRAPHY         Family History:  Family History   Problem Relation Age of Onset     Lung Cancer Mother        Social History:  Social History     Socioeconomic History     Marital status:      Spouse name: Not on file     Number of children: Not on file     Years of education: Not on file     Highest education level: Not on file   Occupational History     Not on file   Tobacco Use     Smoking status: Never     Smokeless tobacco: Current     Types: Chew   Substance and Sexual Activity     Alcohol use: Not Currently     Drug use: Not Currently     Sexual activity: Not on file   Other Topics Concern     Not on file   Social History Narrative     Not on file     Social Determinants of Health     Financial Resource Strain: Not on file   Food Insecurity: Not on file   Transportation Needs: Not on file   Physical Activity: Not on file   Stress: Not on file   Social Connections: Not on file   Intimate Partner Violence: Not on file   Housing Stability: Not on file       Medications:  No current outpatient medications on file.       Allergies:     Allergies   Allergen Reactions     Azithromycin Hives     Erythromycin Rash       Review of Symptoms:  A 10 point review of symptoms has been conducted and is negative except for that mentioned in the above  "HPI.    Physical Exam:    Blood pressure 112/64, pulse 74, temperature 98.7  F (37.1  C), temperature source Oral, resp. rate 16, height 1.778 m (5' 10\"), weight 101.1 kg (222 lb 12.8 oz), SpO2 97 %.  Gen:    Lying in bed, jaudice  CV:  Well perfused  Pulm:  Non-labored breathing  Abd:  Soft, mildly distended, tender in upper abdomen (L>R) with voluntary guarding, no rebound.  Ext:  Warm and well perfused, no obvious deformities    Labs:  CBC RESULTS:   Recent Labs   Lab Test 03/22/23  0406   WBC 12.5*   RBC 4.11*   HGB 12.2*   HCT 36.8*   MCV 90   MCH 29.7   MCHC 33.2   RDW 12.1        Last Basic Metabolic Panel:  Lab Results   Component Value Date     03/22/2023      Lab Results   Component Value Date    POTASSIUM 4.7 03/22/2023     Lab Results   Component Value Date    CHLORIDE 99 03/22/2023     Lab Results   Component Value Date    ARSH 9.4 03/22/2023     Lab Results   Component Value Date    CO2 23 03/22/2023     Lab Results   Component Value Date    BUN 25.7 03/22/2023     Lab Results   Component Value Date    CR 1.15 03/22/2023     Lab Results   Component Value Date     03/22/2023       Imaging:  CT CAP:  IMPRESSION:   1.  No convincing cause of acute pain identified in the abdomen or pelvis.  2.  4 cm pancreatic head mass again noted and consistent with the known pancreatic neoplasm.  3.  A few borderline and mildly enlarged peripancreatic lymph nodes in addition to a few nonenlarged lymph nodes elsewhere in the upper abdomen. These are nonspecific, but could represent metastatic neoplasm.  4.  0.9 cm low-attenuation focus in the left lobe of the liver, not convincingly changed since the comparison study, allowing for differences in technique. This is nonspecific, but a hepatic metastasis is a possibility. If clinically relevant, an MR of   the liver could be considered for further evaluation.    AXR:  Findings:   Supine view the abdomen. Linear air along the gallbladder wall.  Metallic " stent projects over the jostin hepatis. There is pneumobilia.  Bowel gas pattern is nonobstructive. Lung bases are clear. Heart size  is normal.                                                                      Impression: Linear air along the gallbladder wall, suspicious for  emphysematous cholecystitis. Stable position of metallic biliary  stent. Consider CT abdomen for confirmation.

## 2023-03-22 NOTE — PLAN OF CARE
"Goal Outcome Evaluation:    /54 (BP Location: Left arm)   Pulse 89   Temp 99.5  F (37.5  C) (Oral)   Resp 18   Ht 1.778 m (5' 10\")   Wt 101.1 kg (222 lb 12.8 oz)   SpO2 92%   BMI 31.97 kg/m      1514-0624:    Reason for admission:     Activity: Up with assist of 1. Up in chair and to bathroom.  Pain: 7-8/10 right lower abdomen pain. PRN IV Dilaudid given x3.   Neuro: A&O x4. Denies numbness, tingling and headache.  Cardiac: Denies chest pain.  Respiratory: Denies shortness of breath. Reports troubles with taking deep breaths.  GI/: Voiding spontaneously. No BM this shift. Denies nausea and vomiting.  Diet: NPO  Lines: 2-PIV's.   Skin: Jaundice.  Labs/imaging: BG-246 at 1730. BG-192 at 2200.  CT of abdomen show suspected emphysematous cholecystitis.         Plan:  IR to place PTC on 03/23/23 at 0800.      Continue to monitor and follow POC        "

## 2023-03-22 NOTE — PROGRESS NOTES
Nursing Focus: Admission    D: Patient admitted/transferred from PACU via stretcher post ERCP.      I: Upon arrival to the unit patient was oriented to room, unit, and call light. Patient s height, weight, and vital signs were obtained. Allergies reviewed and allergy band applied. MD notified of patient s arrival on the unit. Adult AVS completed. Head to toe assessment completed. Education assessment completed. Care plan initiated.     A: Vital signs stable upon admission. Patient denies pain. Two RN skin assessment completed Yes. Second RN was Jesica Liz RN. No Significant Skin Findings.     P: Continue to monitor patient s pain and intervene as needed. Continue with plan of care. Notify MD with any concerns or changes in patient status.

## 2023-03-23 ENCOUNTER — APPOINTMENT (OUTPATIENT)
Dept: OCCUPATIONAL THERAPY | Facility: CLINIC | Age: 58
End: 2023-03-23
Attending: STUDENT IN AN ORGANIZED HEALTH CARE EDUCATION/TRAINING PROGRAM
Payer: COMMERCIAL

## 2023-03-23 ENCOUNTER — APPOINTMENT (OUTPATIENT)
Dept: INTERVENTIONAL RADIOLOGY/VASCULAR | Facility: CLINIC | Age: 58
End: 2023-03-23
Attending: RADIOLOGY
Payer: COMMERCIAL

## 2023-03-23 ENCOUNTER — APPOINTMENT (OUTPATIENT)
Dept: PHYSICAL THERAPY | Facility: CLINIC | Age: 58
End: 2023-03-23
Attending: STUDENT IN AN ORGANIZED HEALTH CARE EDUCATION/TRAINING PROGRAM
Payer: COMMERCIAL

## 2023-03-23 ENCOUNTER — APPOINTMENT (OUTPATIENT)
Dept: EDUCATION SERVICES | Facility: CLINIC | Age: 58
End: 2023-03-23
Attending: PHYSICIAN ASSISTANT
Payer: COMMERCIAL

## 2023-03-23 LAB
ALBUMIN SERPL BCG-MCNC: 3.3 G/DL (ref 3.5–5.2)
ALP SERPL-CCNC: 163 U/L (ref 40–129)
ALT SERPL W P-5'-P-CCNC: 75 U/L (ref 10–50)
ANION GAP SERPL CALCULATED.3IONS-SCNC: 12 MMOL/L (ref 7–15)
AST SERPL W P-5'-P-CCNC: 66 U/L (ref 10–50)
BASOPHILS # BLD AUTO: 0 10E3/UL (ref 0–0.2)
BASOPHILS NFR BLD AUTO: 0 %
BILIRUB SERPL-MCNC: 3.7 MG/DL
BUN SERPL-MCNC: 27.9 MG/DL (ref 6–20)
CALCIUM SERPL-MCNC: 9.5 MG/DL (ref 8.6–10)
CHLORIDE SERPL-SCNC: 100 MMOL/L (ref 98–107)
CREAT SERPL-MCNC: 1.07 MG/DL (ref 0.67–1.17)
DEPRECATED HCO3 PLAS-SCNC: 24 MMOL/L (ref 22–29)
EOSINOPHIL # BLD AUTO: 0 10E3/UL (ref 0–0.7)
EOSINOPHIL NFR BLD AUTO: 0 %
ERYTHROCYTE [DISTWIDTH] IN BLOOD BY AUTOMATED COUNT: 12.4 % (ref 10–15)
GFR SERPL CREATININE-BSD FRML MDRD: 81 ML/MIN/1.73M2
GLUCOSE BLDC GLUCOMTR-MCNC: 160 MG/DL (ref 70–99)
GLUCOSE BLDC GLUCOMTR-MCNC: 188 MG/DL (ref 70–99)
GLUCOSE BLDC GLUCOMTR-MCNC: 192 MG/DL (ref 70–99)
GLUCOSE BLDC GLUCOMTR-MCNC: 214 MG/DL (ref 70–99)
GLUCOSE BLDC GLUCOMTR-MCNC: 231 MG/DL (ref 70–99)
GLUCOSE BLDC GLUCOMTR-MCNC: 336 MG/DL (ref 70–99)
GLUCOSE SERPL-MCNC: 195 MG/DL (ref 70–99)
HCT VFR BLD AUTO: 35.8 % (ref 40–53)
HGB BLD-MCNC: 11.9 G/DL (ref 13.3–17.7)
HOLD SPECIMEN: NORMAL
IMM GRANULOCYTES # BLD: 0.1 10E3/UL
IMM GRANULOCYTES NFR BLD: 0 %
LYMPHOCYTES # BLD AUTO: 0.8 10E3/UL (ref 0.8–5.3)
LYMPHOCYTES NFR BLD AUTO: 7 %
MCH RBC QN AUTO: 29.8 PG (ref 26.5–33)
MCHC RBC AUTO-ENTMCNC: 33.2 G/DL (ref 31.5–36.5)
MCV RBC AUTO: 90 FL (ref 78–100)
MONOCYTES # BLD AUTO: 0.5 10E3/UL (ref 0–1.3)
MONOCYTES NFR BLD AUTO: 4 %
NEUTROPHILS # BLD AUTO: 10.8 10E3/UL (ref 1.6–8.3)
NEUTROPHILS NFR BLD AUTO: 89 %
NRBC # BLD AUTO: 0 10E3/UL
NRBC BLD AUTO-RTO: 0 /100
PLATELET # BLD AUTO: 228 10E3/UL (ref 150–450)
POTASSIUM SERPL-SCNC: 4.4 MMOL/L (ref 3.4–5.3)
PROT SERPL-MCNC: 7 G/DL (ref 6.4–8.3)
RBC # BLD AUTO: 3.99 10E6/UL (ref 4.4–5.9)
SODIUM SERPL-SCNC: 136 MMOL/L (ref 136–145)
WBC # BLD AUTO: 12.2 10E3/UL (ref 4–11)

## 2023-03-23 PROCEDURE — 97535 SELF CARE MNGMENT TRAINING: CPT | Mod: GO

## 2023-03-23 PROCEDURE — 99152 MOD SED SAME PHYS/QHP 5/>YRS: CPT | Performed by: RADIOLOGY

## 2023-03-23 PROCEDURE — 258N000003 HC RX IP 258 OP 636: Performed by: STUDENT IN AN ORGANIZED HEALTH CARE EDUCATION/TRAINING PROGRAM

## 2023-03-23 PROCEDURE — 250N000013 HC RX MED GY IP 250 OP 250 PS 637: Performed by: STUDENT IN AN ORGANIZED HEALTH CARE EDUCATION/TRAINING PROGRAM

## 2023-03-23 PROCEDURE — 250N000009 HC RX 250: Performed by: PHYSICIAN ASSISTANT

## 2023-03-23 PROCEDURE — C1769 GUIDE WIRE: HCPCS

## 2023-03-23 PROCEDURE — 250N000011 HC RX IP 250 OP 636: Performed by: PHYSICIAN ASSISTANT

## 2023-03-23 PROCEDURE — 87040 BLOOD CULTURE FOR BACTERIA: CPT | Performed by: STUDENT IN AN ORGANIZED HEALTH CARE EDUCATION/TRAINING PROGRAM

## 2023-03-23 PROCEDURE — 999N000147 HC STATISTIC PT IP EVAL DEFER

## 2023-03-23 PROCEDURE — 87070 CULTURE OTHR SPECIMN AEROBIC: CPT | Performed by: RADIOLOGY

## 2023-03-23 PROCEDURE — C1729 CATH, DRAINAGE: HCPCS

## 2023-03-23 PROCEDURE — 250N000011 HC RX IP 250 OP 636: Performed by: STUDENT IN AN ORGANIZED HEALTH CARE EDUCATION/TRAINING PROGRAM

## 2023-03-23 PROCEDURE — 36415 COLL VENOUS BLD VENIPUNCTURE: CPT | Performed by: STUDENT IN AN ORGANIZED HEALTH CARE EDUCATION/TRAINING PROGRAM

## 2023-03-23 PROCEDURE — 99152 MOD SED SAME PHYS/QHP 5/>YRS: CPT

## 2023-03-23 PROCEDURE — 97165 OT EVAL LOW COMPLEX 30 MIN: CPT | Mod: GO

## 2023-03-23 PROCEDURE — 85025 COMPLETE CBC W/AUTO DIFF WBC: CPT | Performed by: STUDENT IN AN ORGANIZED HEALTH CARE EDUCATION/TRAINING PROGRAM

## 2023-03-23 PROCEDURE — 99232 SBSQ HOSP IP/OBS MODERATE 35: CPT | Performed by: STUDENT IN AN ORGANIZED HEALTH CARE EDUCATION/TRAINING PROGRAM

## 2023-03-23 PROCEDURE — 87075 CULTR BACTERIA EXCEPT BLOOD: CPT | Performed by: RADIOLOGY

## 2023-03-23 PROCEDURE — 999N000157 HC STATISTIC RCP TIME EA 10 MIN

## 2023-03-23 PROCEDURE — 87077 CULTURE AEROBIC IDENTIFY: CPT | Performed by: RADIOLOGY

## 2023-03-23 PROCEDURE — 47490 INCISION OF GALLBLADDER: CPT | Performed by: RADIOLOGY

## 2023-03-23 PROCEDURE — 80053 COMPREHEN METABOLIC PANEL: CPT | Performed by: STUDENT IN AN ORGANIZED HEALTH CARE EDUCATION/TRAINING PROGRAM

## 2023-03-23 PROCEDURE — 255N000002 HC RX 255 OP 636: Performed by: RADIOLOGY

## 2023-03-23 PROCEDURE — 120N000002 HC R&B MED SURG/OB UMMC

## 2023-03-23 RX ORDER — FENTANYL CITRATE 50 UG/ML
25-50 INJECTION, SOLUTION INTRAMUSCULAR; INTRAVENOUS EVERY 5 MIN PRN
Status: DISCONTINUED | OUTPATIENT
Start: 2023-03-23 | End: 2023-03-23

## 2023-03-23 RX ORDER — NALOXONE HYDROCHLORIDE 0.4 MG/ML
0.4 INJECTION, SOLUTION INTRAMUSCULAR; INTRAVENOUS; SUBCUTANEOUS
Status: DISCONTINUED | OUTPATIENT
Start: 2023-03-23 | End: 2023-03-23

## 2023-03-23 RX ORDER — NALOXONE HYDROCHLORIDE 0.4 MG/ML
0.2 INJECTION, SOLUTION INTRAMUSCULAR; INTRAVENOUS; SUBCUTANEOUS
Status: DISCONTINUED | OUTPATIENT
Start: 2023-03-23 | End: 2023-03-23

## 2023-03-23 RX ORDER — FLUMAZENIL 0.1 MG/ML
0.2 INJECTION, SOLUTION INTRAVENOUS
Status: DISCONTINUED | OUTPATIENT
Start: 2023-03-23 | End: 2023-03-23

## 2023-03-23 RX ORDER — IODIXANOL 320 MG/ML
100 INJECTION, SOLUTION INTRAVASCULAR ONCE
Status: COMPLETED | OUTPATIENT
Start: 2023-03-23 | End: 2023-03-23

## 2023-03-23 RX ADMIN — INSULIN ASPART 2 UNITS: 100 INJECTION, SOLUTION INTRAVENOUS; SUBCUTANEOUS at 17:22

## 2023-03-23 RX ADMIN — OXYCODONE HYDROCHLORIDE 5 MG: 5 TABLET ORAL at 05:48

## 2023-03-23 RX ADMIN — FENTANYL CITRATE 50 MCG: 50 INJECTION, SOLUTION INTRAMUSCULAR; INTRAVENOUS at 08:43

## 2023-03-23 RX ADMIN — FENTANYL CITRATE 50 MCG: 50 INJECTION, SOLUTION INTRAMUSCULAR; INTRAVENOUS at 08:36

## 2023-03-23 RX ADMIN — INSULIN GLARGINE 15 UNITS: 100 INJECTION, SOLUTION SUBCUTANEOUS at 09:49

## 2023-03-23 RX ADMIN — ACETAMINOPHEN 975 MG: 325 TABLET ORAL at 13:53

## 2023-03-23 RX ADMIN — MIDAZOLAM 1 MG: 1 INJECTION INTRAMUSCULAR; INTRAVENOUS at 08:36

## 2023-03-23 RX ADMIN — ACETAMINOPHEN 975 MG: 325 TABLET ORAL at 20:09

## 2023-03-23 RX ADMIN — LIDOCAINE HYDROCHLORIDE 9 ML: 10 INJECTION, SOLUTION EPIDURAL; INFILTRATION; INTRACAUDAL; PERINEURAL at 08:43

## 2023-03-23 RX ADMIN — METOPROLOL SUCCINATE 25 MG: 25 TABLET, EXTENDED RELEASE ORAL at 09:42

## 2023-03-23 RX ADMIN — MIDAZOLAM 1 MG: 1 INJECTION INTRAMUSCULAR; INTRAVENOUS at 08:43

## 2023-03-23 RX ADMIN — PIPERACILLIN AND TAZOBACTAM 4.5 G: 4; .5 INJECTION, POWDER, FOR SOLUTION INTRAVENOUS at 14:51

## 2023-03-23 RX ADMIN — OXYCODONE HYDROCHLORIDE 10 MG: 5 TABLET ORAL at 09:57

## 2023-03-23 RX ADMIN — PIPERACILLIN AND TAZOBACTAM 4.5 G: 4; .5 INJECTION, POWDER, FOR SOLUTION INTRAVENOUS at 01:47

## 2023-03-23 RX ADMIN — ALLOPURINOL 100 MG: 100 TABLET ORAL at 09:41

## 2023-03-23 RX ADMIN — ACETAMINOPHEN 975 MG: 325 TABLET ORAL at 09:41

## 2023-03-23 RX ADMIN — FENTANYL CITRATE 50 MCG: 50 INJECTION, SOLUTION INTRAMUSCULAR; INTRAVENOUS at 08:51

## 2023-03-23 RX ADMIN — PIPERACILLIN AND TAZOBACTAM 4.5 G: 4; .5 INJECTION, POWDER, FOR SOLUTION INTRAVENOUS at 07:41

## 2023-03-23 RX ADMIN — SODIUM CHLORIDE, POTASSIUM CHLORIDE, SODIUM LACTATE AND CALCIUM CHLORIDE: 600; 310; 30; 20 INJECTION, SOLUTION INTRAVENOUS at 02:03

## 2023-03-23 RX ADMIN — OXYCODONE HYDROCHLORIDE 10 MG: 5 TABLET ORAL at 01:45

## 2023-03-23 RX ADMIN — IODIXANOL 5 ML: 320 INJECTION, SOLUTION INTRAVASCULAR at 09:05

## 2023-03-23 RX ADMIN — HYDROMORPHONE HYDROCHLORIDE 0.5 MG: 1 INJECTION, SOLUTION INTRAMUSCULAR; INTRAVENOUS; SUBCUTANEOUS at 07:41

## 2023-03-23 RX ADMIN — INSULIN ASPART 1 UNITS: 100 INJECTION, SOLUTION INTRAVENOUS; SUBCUTANEOUS at 13:51

## 2023-03-23 RX ADMIN — HYDROMORPHONE HYDROCHLORIDE 0.5 MG: 1 INJECTION, SOLUTION INTRAMUSCULAR; INTRAVENOUS; SUBCUTANEOUS at 03:33

## 2023-03-23 RX ADMIN — HYDROMORPHONE HYDROCHLORIDE 0.5 MG: 1 INJECTION, SOLUTION INTRAMUSCULAR; INTRAVENOUS; SUBCUTANEOUS at 00:40

## 2023-03-23 RX ADMIN — PIPERACILLIN AND TAZOBACTAM 4.5 G: 4; .5 INJECTION, POWDER, FOR SOLUTION INTRAVENOUS at 20:10

## 2023-03-23 ASSESSMENT — ACTIVITIES OF DAILY LIVING (ADL)
ADLS_ACUITY_SCORE: 20
ADLS_ACUITY_SCORE: 20
ADLS_ACUITY_SCORE: 22
ADLS_ACUITY_SCORE: 22
ADLS_ACUITY_SCORE: 20
ADLS_ACUITY_SCORE: 22
ADLS_ACUITY_SCORE: 22
ADLS_ACUITY_SCORE: 24
ADLS_ACUITY_SCORE: 22

## 2023-03-23 NOTE — PROGRESS NOTES
Patient Name: Slick Lizarraga  Medical Record Number: [id number]  Today's Date: 3/23/2023    Procedure: Image Guided Percutaneous Gallbladder Drain Placement  Proceduralist: Dr. Chand, Ren Madrid PA-C & Dr. Dixon  Pathology present: N/A    Procedure Start: 0838  Procedure end: 0901  Sedation medications administered: 2 mg versed & 150 mcg Fentanyl     Report given to: Jodie LARSON RN  : N/A    Other Notes: Pt arrived to IR room 5 from 7D. Consent reviewed. Pt denies any questions or concerns regarding procedure. Pt positioned supine and monitored per protocol. Pt tolerated procedure without any noted complications. Pt transferred back to 7D.

## 2023-03-23 NOTE — PROGRESS NOTES
Surgical Oncology Progress Note    S:  Slight improvement in abdominal pain last night, but still having episodes of severe pain. Continues to pass flatus, though decreased. No nausea.     O:  Temp:  [97.7  F (36.5  C)-99.5  F (37.5  C)] 98.9  F (37.2  C)  Pulse:  [74-92] 87  Resp:  [16-22] 20  BP: (100-120)/(51-64) 117/58  SpO2:  [92 %-99 %] 96 %    I/O last 3 completed shifts:  In: 2130 [P.O.:30; I.V.:2100]  Out: 225 [Urine:225]    Gen: A&Ox3, NAD  Resp: non-labored breathing  CV: Well perfused  Abd: Soft, mildly distended, tender to palpation in upper abdomen, now right worse than left. No rebound or guarding.    Labs:  Complete Blood Count   Recent Labs   Lab 03/23/23  0100 03/22/23  1709 03/22/23  0406 03/22/23  0249   WBC 12.2* 12.5* 12.5* 13.5*   HGB 11.9* 13.0* 12.2* 12.2*    215 202 198     Basic Metabolic Panel  Recent Labs   Lab 03/23/23  0553 03/23/23  0138 03/22/23  2222 03/22/23  1734 03/22/23  1709 03/22/23  0739 03/22/23  0614 03/22/23  0218 03/22/23  0156     --   --   --  136  --  134*  --  132*   POTASSIUM 4.4  --   --   --  4.6  --  4.7  --  5.0   CHLORIDE 100  --   --   --  99  --  99  --  100   CO2 24  --   --   --  23  --  23  --  21*   BUN 27.9*  --   --   --  28.8*  --  25.7*  --  24.3*   CR 1.07  --   --   --  1.16  --  1.15  --  1.12   * 160* 192* 246* 262*   < > 322*   < > 346*    < > = values in this interval not displayed.     Liver Function Tests  Recent Labs   Lab 03/23/23  0553 03/22/23  1709 03/22/23  0614 03/22/23  0156 03/21/23  1422 03/21/23  0322 03/20/23  0955   AST 66*  --  46 45 66*   < > 73*   ALT 75* 72* 76* 73* 99*   < > 104*   ALKPHOS 163* 169* 186* 171* 242*   < > 262*   BILITOTAL 3.7* 4.0* 4.1* 3.8* 4.7*   < > 4.8*   ALBUMIN 3.3* 3.3* 3.5 3.2* 4.1   < > 4.4   INR  --  1.49* 1.40*  --   --   --  1.05    < > = values in this interval not displayed.     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 He initially underwent ERCP with CBD stenting on 3/14. ERCP performed 3/22 with dilation of the previously placed stent. Cystic duct did not opacify during ERCP, now with emphysematous cholecystitis. Patient feeling slightly better this AM, but likely the fastest way to get him to chemotherapy would be percutaneous drainage.    - IR to place percutaneous cholecystostomy tube this AM  - Continue IV antibiotics  - Advance diet as tolerated after procedure  - Repeat labs tomorrow AM  - Will continue to follow    Patient discussed with Dr. Oseas Whitney, PGY-4  General Surgery

## 2023-03-23 NOTE — PLAN OF CARE
Physical Therapy: Orders received. Chart reviewed and discussed with care team.? Physical Therapy not indicated due to pt being up IND and per OT discussion; no acute PT needs identified.? Defer discharge recommendations to OT and care team.? Will complete orders.

## 2023-03-23 NOTE — CONSULTS
PLC teaching at bedside with patient, wife and children for new drainage tube with daily irrigation.    Taught site cares, dressing change, securing the tube and emptying the bag. We lastly irrigated with 10ml of saline as a demo on the model, but writer also did the first irrigation of the patient's drain per request.    Wife Smita, demonstrated understanding of all cares, including emptying the patient's bag.    Both of his children were interested to learning and also practiced the irrigation of tube.    Literature given: Handwashing and Skin Care, Drainage Tube Home Care Instructions, Flushing Your Drain with Saline.

## 2023-03-23 NOTE — PROCEDURES
[facility]    Procedure: IR Procedure Note    Date/Time: 3/23/2023 9:06 AM  Performed by: Cheo Madrid PA-C  Authorized by: Cheo Madrid PA-C   IR Fellow Physician:  Other(s) attending procedure: Attending physician: MEE Chand MD. Assist: KEYUR Madrid PA-C      UNIVERSAL PROTOCOL   Site Marked: NA  Prior Images Obtained and Reviewed:  Yes  Required items: Required blood products, implants, devices and special equipment available    Patient identity confirmed:  Verbally with patient, arm band, provided demographic data and hospital-assigned identification number  Patient was reevaluated immediately before administering moderate or deep sedation or anesthesia  Confirmation Checklist:  Patient's identity using two indicators, relevant allergies, procedure was appropriate and matched the consent or emergent situation and correct equipment/implants were available  Time out: Immediately prior to the procedure a time out was called    Universal Protocol: the Joint Commission Universal Protocol was followed    Preparation: Patient was prepped and draped in usual sterile fashion    ESBL (mL):  0.5     ANESTHESIA    Anesthesia: Local infiltration  Local Anesthetic:  Lidocaine 1% without epinephrine  Anesthetic Total (mL):  9      SEDATION  Patient Sedated: Yes    Sedation Type:  Moderate (conscious) sedation  Sedation:  Fentanyl and midazolam  Vital signs: Vital signs monitored during sedation    See dictated procedure note for full details.  Findings: Emphysematous cholecystitis evident on sonography and fluoroscopy. Image guided placement of RUQ transhepatic 8 Fr. Locking pigtail cholecystostomy drain. Free return of bile, 10 ml of which was aspirated and sent for labs as ordered. Cholecystostomy tube connected to gravity drainage.    Specimens: fluid and/or tissue for laboratory analysis    Complications: None    Condition: Stable    Plan: Follow up per  primary team. Flush drain once daily with 10 ml saline to maintain patency.       PROCEDURE    Patient Tolerance:  Patient tolerated the procedure well with no immediate complications  Length of time physician/provider present for 1:1 monitoring during sedation: 20   Time of sedation: 23 minutes.

## 2023-03-23 NOTE — PROGRESS NOTES
"[id number]    Blood pressure 120/73, pulse 88, temperature 98.9  F (37.2  C), temperature source Oral, resp. rate 17, height 1.778 m (5' 10\"), weight 100.8 kg (222 lb 3.2 oz), SpO2 92 %.    AO x4. VSS on RA. Pain managed w/ PRN IV Dilaudid and PO oxycodone. Denies SOB/nausea at this time. S/p percutaneous cholecystostomy tube placement in IR, dressing CDI w/ no bleeding. Bile colored output present. Pt education regarding drain care completed per educator. Voids spontaneously. Tolerating IV ABX well.    Continue w/ POC.    "

## 2023-03-23 NOTE — PROGRESS NOTES
"   03/23/23 1400   Appointment Info   Signing Clinician's Name / Credentials (OT) ISAAC Chaves   Student Supervision On-site supervision provided   Living Environment   People in Home spouse;child(maribel), dependent  (Children ages 9 and 10)   Current Living Arrangements homeless   Home Accessibility stairs to enter home;stairs within home   Number of Stairs, Main Entrance 3   Stair Railings, Main Entrance   (Railings present, rewview w/ pt)   Number of Stairs, Within Home, Primary ten   Stair Railings, Within Home, Primary none  (10 stairs w/o railings to descend to basement. Pt has no needs to walk down stairs for months following return home.)   Transportation Anticipated family or friend will provide  (Pt drives. Wife will drive pt home to [place])   Living Environment Comments Pt lives in a house. All needs met on main floor. Has tub/shower and walk-in, but prefers walk-in.   Self-Care   Usual Activity Tolerance good   Current Activity Tolerance good   Equipment Currently Used at Home none   Fall history within last six months no  (No, other than pt report of falling while shoveling due to ice.)   Activity/Exercise/Self-Care Comment Pt IND in ADLs at baseline   Instrumental Activities of Daily Living (IADL)   Previous Responsibilities meal prep;laundry;housekeeping;shopping;yardwork;medication management;driving;finances;work;   IADL Comments Pt IND in all IADLs at baseline   General Information   Onset of Illness/Injury or Date of Surgery 03/21/23   Referring Physician Jaylon Pugh MD   Patient/Family Therapy Goal Statement (OT) To return home   Additional Occupational Profile Info/Pertinent History of Current Problem Per EMR, \"57 year old male with a history of HTN, DAYAMI, DM, and recently diagnosed adenocarcinoma of the pancreatic head. He underwent ERCP and CBD stenting on 3/14, ERCP repeated 3/21 and found to have emphysematous cholecystitis.  There was no opacification of the " "gallbladder with contrast during ERCP.\"   Existing Precautions/Restrictions no known precautions/restrictions   Left Upper Extremity (Weight-bearing Status) full weight-bearing (FWB)   Right Upper Extremity (Weight-bearing Status) full weight-bearing (FWB)   Left Lower Extremity (Weight-bearing Status) full weight-bearing (FWB)   Right Lower Extremity (Weight-bearing Status) full weight-bearing (FWB)   Cognitive Status Examination   Orientation Status orientation to person, place and time   Cognitive Status Comments WFL   Visual Perception   Visual Impairment/Limitations other (see comments)  (Cheaters for small print)   Impact of Vision Impairment on Function (Vision) WFL   Sensory   Sensory Quick Adds sensation intact   Sensory Comments WFL   Posture   Posture not impaired   Range of Motion Comprehensive   General Range of Motion no range of motion deficits identified   Strength Comprehensive (MMT)   Comment, General Manual Muscle Testing (MMT) Assessment Not assessed due to recent abdominal tube placement. Per clinical judgement, pt's UE/LE WFL   Muscle Tone Assessment   Muscle Tone Comments Not assessed due to recent abdominal tube placement. Per clinical judgement, pt's UE/LE WFL   Coordination   Upper Extremity Coordination No deficits were identified   Coordination Comments WFL   Bed Mobility   Bed Mobility No deficits identified   Sit-Stand Transfer   Sit-Stand [place] (Transfers) supervision   Shower Transfer   [place] Level (Shower Transfer) supervision   Balance   Balance Assessment no deficits were identified   Balance Comments WFL   Activities of Daily Living   BADL Assessment/Intervention bathing;upper body dressing;lower body dressing;grooming;toileting   Bathing Assessment/Intervention   [place] Level (Bathing) modified independence   Assistive Devices (Bathing) shower chair  (OT educated pt to use shower chair during hospital stay to promote safety)   Upper Body Dressing " Assessment/Training   Position (Upper Body Dressing) unsupported sitting   Comment, (Upper Body Dressing) Per clinical judgement   [place] Level (Upper Body Dressing) independent   Lower Body Dressing Assessment/Training   Position (Lower Body Dressing) unsupported sitting   Comment, (Lower Body Dressing) Per clinical judgement   [place] Level (Lower Body Dressing) modified independence   Grooming Assessment/Training   Position (Grooming) supported standing   [place] Level (Grooming) independent   Comment, (Grooming) Per clinical judgement   Toileting   [place] Level (Toileting) independent   Clinical Impression   Criteria for Skilled Therapeutic Interventions Met (OT) Evaluation only   OT Diagnosis Pt presents w/ continued IND/Mod I in all ADL/IADLs.   OT Problem List-Impairments impacting ADL problems related to;activity tolerance impaired   Assessment of Occupational Performance 1-3 Performance Deficits   Identified Performance Deficits functional ambulation endurance/activity tolerance   Planned Therapy Interventions (OT) ADL retraining;transfer training   Clinical Decision Making Complexity (OT) low complexity   Anticipated Equipment Needs Upon Discharge (OT)   (None)   Risk & Benefits of therapy have been explained evaluation/treatment results reviewed;care plan/treatment goals reviewed;risks/benefits reviewed;participants voiced agreement with care plan;participants included;patient   Clinical Impression Comments Pt presents IND/Mod I in all ADL/IADLs. Further treatment following initial eval/treat not indicated due to pt mobilizing well and demo'ing safety w/ transfers/ADLs.   OT Total Evaluation Time   OT Eval, Low Complexity Minutes (48600) 10   OT Goals   Therapy Frequency (OT) One time eval and treatment   OT Goals Bed Mobility;Toilet Transfer/Toileting;Transfers   OT: Bed Mobility Independent;Goal Met   OT: Transfer Independent;Goal Met   OT: Toilet Transfer/Toileting  Independent;Goal Met   Self-Care/Home Management   Self-Care/Home Mgmt/ADL, Compensatory, Meal Prep Minutes (48605) 24   Symptoms Noted During/After Treatment (Meal Preparation/Planning Training) none   Treatment Detail/Skilled Intervention Pt supine in bed at arrival for session, pt agreeing to plan for initial eval following edu on role and value of OT. RN/NST present to provide meds and assist in prep for shower during OT questions. Following eval, AVSS. OT provided edu to limit pain during shower task following abdominal tube placement this am including limiting twisting and bending, pt demo'd understanding. Bed mobility/STS supervision. Pt demo'd safe ambulation approx 10 ft w/ SBA to bathroom, pt initiating use of toilet. Pt participated w/ supervision, demo'ing safe standing and descend/ascend from toilet. OT provided instruction to ensure pt utilizes shower chair to promote safety, instructing on risk of falls during hospital stay following pt report he's frustrated by multiple providers not trusting him on his own. Pt receptive to use shower chair, safe and ind transfer. OT provided review of pt mobility and level of ind, pt agreed he has no acute needs for rehab during LOS. Pt left in shower following instruction to use emergency call for assistance if needed. Once left ind'ly, pt called due to difficulty w/ opening small wrapping over body wash container, pt otherwise safe and ind. OT provided total A/set up and ensured pt safe to exit shower, NST notified.   OT Discharge Planning   OT Plan D/C from OT   OT Discharge Recommendation (DC Rec) home with assist   OT Rationale for DC Rec Pt ambulating well and performing BADLs ind'ly. Pt has a spouse and two children at home who can provide assistance. Pt demo's safety to return home w/ assistance from family once medically stable.   OT Brief overview of current status Supervision   Total Session Time   Timed Code Treatment Minutes 24   Total Session Time (sum  of timed and untimed services) 34

## 2023-03-23 NOTE — PROVIDER NOTIFICATION
#0022    Pt back from IR procedure, requesting diet order be changed. Also in significant pain at drain site, will give 5mg oxycodone and reassess.

## 2023-03-23 NOTE — PLAN OF CARE
"/58 (BP Location: Left arm)   Pulse 87   Temp 98.9  F (37.2  C) (Oral)   Resp 20   Ht 1.778 m (5' 10\")   Wt 101.1 kg (222 lb 12.8 oz)   SpO2 96%   BMI 31.97 kg/m      Goal Outcome Evaluation:      Plan of Care Reviewed With: patient    Overall Patient Progress: no changeOverall Patient Progress: no change     Neuro: A&Ox4. Forgetful. Bed and chair alarm on.  Cardiac: Afebrile, VSS.   Respiratory: RA   GI/: Voiding spontaneously. Urine brown/icteric. Urine volumes wdl. No BM this shift.   Diet/appetite: NPO. Denies nausea   Endocrine: q4 hr BG checks- 160 and 195  Activity: Up with stand by assist. Gait steady.   Pain: .shoulder abd and shoulder pain decreased with PRN oxycodone x2 and IV dilaudid x2.  Skin: No new deficits noted.  Lines: PIV x2. LR infusing at 100 ml/ hr.. other piv sl'd btwn use.    Plan: IR this am for percutaneous drain placement.   Rested btwn cares. Able to make needs known. Continue to monitor. Notify MD of changes/concerns.      Problem: Plan of Care - These are the overarching goals to be used throughout the patient stay.    Goal: Optimal Comfort and Wellbeing  Outcome: Not Progressing     Problem: Pain Acute  Goal: Optimal Pain Control and Function  Outcome: Not Progressing     Problem: Plan of Care - These are the overarching goals to be used throughout the patient stay.    Goal: Readiness for Transition of Care  Outcome: Not Progressing           "

## 2023-03-23 NOTE — PROGRESS NOTES
[facility]    Progress Note - Medicine Service, MARVIN TEAM 1       Date of Admission:  3/21/2023    Assessment & Plan   Slick Lizarraga is a 57 year old man with a history of recently diagnosed pancreatic adenocarcinoma, biliary stent placement 3/14/23, HTN, type 2 diabetes, gout, and DAYAMI who presents with epigastric abdominal pain for 12 hours found to have severe sepsis presumed secondary to cholangitis now s/p biliary stent exchange and evidence of emphysematous cholecystitis on imaging.    Today  - Zosyn 4.5 g Q6H, will consider changing to Levofloxacin for optimal OP management  - Follow-up blood cultures  - Glargine 15 U QAM  - Encourage use of PO pain meds before IV  - Health Psychology consult d/t pancreatic cancer dx w/ young children at home  - Discontinue fluids due to adequate PO fluid intake  - Low-fat diet    Severe sepsis secondary to emphysematous cholecystitis vs. cholangitis  Pancreatic adenocarcinoma with obstruction s/p biliary stent (3/14/23 + exchange 3/21/23)   Presented with 12 hours of epigastric abdominal pain subsequently generalized to the upper abdomen bilaterally with radiation to the right upper shoulder in the context of recent biliary stent placement. He had lactic acidosis, leukocytosis and underwent emergent ERCP 3/21 which showed obstruction of stent with food material and sludge/stone which was removed. He was started on IV zosyn 3/21.  - Sepsis d/t emphysematous cholecystitis vs. cholangitis              > Zosyn 4.5 g Q6H              > Follow blood cultures & GB aspirate cultures  - Surgery consult - recommendations below:   > Nursing to teach drain maintenance   > Health psychology consult for managing pancreatic cancer diagnosis w/ young children at home   > Low-fat diet  - Pain control:              > Tylenol 975 mg scheduled TID              > Oxycodone 5-10 Q4H prn               > Dilaudid IV 0.5 mg Q4H prn   - Antiemetics:        "        > Ondansetron 4 mg Q6H PRN              > Prochlorperazine 10 mg Q6H PRN  - Discontinue fluids due to adequate PO fluid intake     Type 2 Diabetes (A1c 8.1%) with hyperglycemia  Home regimen: metformin, glipizide and semaglutide. Continues to be persistently hyperglycemic to 300s.    - Glargine 15 U QAM  - Medium sliding scale insulin   - ACHS glucose checks   - Hypoglycemia protocol  - Hold PTA metformin 1 g daily in setting of sepsis  - Hold PTA semaglutide 2 mg daily and glipizide 10 mg daily given risk for hypoglycemia     0.9 cm low-attenuation focus in the left lobe of the liver, unknown etiology, stable  Found on CT scan, it is nonspecific and not significantly changed from prior study. Ddx includes metastasis.   - Consider MR of liver outpatient for further evaluation     Chronic medical issues:   HTN: Metoprolol succinate 25 mg daily  Gout: Allopurinol 100 mg daily  Insomnia: Melatonin 1 mg QPM PRN      Diet: NPO for Medical/Clinical Reasons Except for: Ice Chips, Meds    DVT Prophylaxis: ambulate  Kilpatrick Catheter: Not present  Fluids: encourage PO  Lines: None     Cardiac Monitoring: None  Code Status: Full Code      Clinically Significant Risk Factors             # Hypoalbuminemia: Lowest albumin = 3.2 g/dL at 3/22/2023  1:56 AM, will monitor as appropriate          # DMII: A1C = 8.1 % (Ref range: <5.7 %) within past 6 months, PRESENT ON ADMISSION  # Obesity: Estimated body mass index is 31.88 kg/m  as calculated from the following:    Height as of this encounter: 1.778 m (5' 10\").    Weight as of this encounter: 100.8 kg (222 lb 3.2 oz)., PRESENT ON ADMISSION         Disposition Plan     Expected Discharge Date: 03/24/2023                The patient's care was discussed with the Attending Physician, Dr. Lillie Alaniz.    Xavier Riggs  Medical Student        Physician Attestation   I, Lillie Alaniz MD, was present with the medical/BROOKE student who participated in the service and in the " documentation of the note.  I have verified the history and personally performed the physical exam and medical decision making.  I agree with the assessment and plan of care as documented in the note.  Changes made to note as appropriate.    Lillie Alaniz MD  Date of Service (when I saw the patient): 03/23/23    Medicine Service, MAROON TEAM 1  [facility]  Securely message with [employer] (more info)  Text page via [employer] Paging/Directory   See signed in provider for up to date coverage information  ______________________________________________________________________    Interval History   NAEO. Continued intermittent episodes of pain exacerbation. Two discrete episodes over night, though both were adequately controlled with pain medications. He describes the pain during these episodes as radiating to his shoulders bilaterally.    Doing well this morning - no nausea or vomiting. Minimal pain at baseline. Able to ambulate and interviewed sitting in chair.    Physical Exam   Vital Signs: Temp: 98.9  F (37.2  C) Temp src: Oral BP: 117/58 Pulse: 87   Resp: 20 SpO2: 96 % O2 Device: None (Room air)    Weight: 222 lbs 3.2 oz    Constitutional: Sitting in chair in no acute distress, conversant and pleasant   HEENT: Conjunctival icterus present, improved from prior  Respiratory: No increased work of breathing, good air exchange, clear to auscultation bilaterally, no crackles or wheezing  Cardiovascular: Normal rate, regular rhythm   GI: Normal bowel sounds, soft, non-distended, tender in epigastric region and RUQ  PCT tube in place with dark liquid output; no adjacent erythema or crepitus  Skin: Jaundice noted, improved from prior  MSK: 1+ lower extremity edema  Neurologic: Awake and alert     Data     I have personally reviewed the following data over the past 24 hrs:    12.2 (H)  \   11.9 (L)   / 228     136 100 27.9 (H) /  195 (H)   4.4 24 1.07 \       ALT: 75 (H) AST: 66  (H) AP: 163 (H) TBILI: 3.7 (H)   ALB: 3.3 (L) TOT PROTEIN: 7.0 LIPASE: N/A       INR:  1.49 (H) PTT:  N/A   D-dimer:  N/A Fibrinogen:  N/A       Imaging results reviewed over the past 24 hrs:   Recent Results (from the past 24 hour(s))   XR Abdomen Port 1 View   Result Value    Radiologist flags suspected emphysematous cholecystitis (AA)    Narrative    Exam: XR ABDOMEN PORT 1 VIEW, 3/22/2023 12:47 PM    Indication: Eval perforation, hx ERCP and biliary stent    Comparison: 3/14/2023 CT    Findings:   Supine view the abdomen. Linear air along the gallbladder wall.  Metallic stent projects over the jostin hepatis. There is pneumobilia.  Bowel gas pattern is nonobstructive. Lung bases are clear. Heart size  is normal.      Impression    Impression: Linear air along the gallbladder wall, suspicious for  emphysematous cholecystitis. Stable position of metallic biliary  stent. Consider CT abdomen for confirmation.    [Critical Result: suspected emphysematous cholecystitis]    Finding was identified on 3/22/2023 12:48 PM.      was contacted by Dr. Donavon MD at 3/22/2023 2:19 PM and  verbalized understanding of the critical finding.     I have personally reviewed the examination and initial interpretation  and I agree with the findings.    ROBIN PATTON MD         SYSTEM ID:  [id number]   CT Abdomen Pelvis w/o Contrast   Result Value    Radiologist flags Emphysematous cholecystitis (Urgent)    Narrative    EXAMINATION: CT ABDOMEN PELVIS W/O CONTRAST, 3/22/2023 4:23 PM    TECHNIQUE:  Helical CT images from the lung bases through the pubic  symphysis were obtained  without IV contrast.     COMPARISON: ct 3/21/2023    HISTORY: 57M with pancreatic adenocarcinoma s/p metal biliary stent  with abdominal pain suspicious for acute cholecystitis due to possible  occlusion of cystic duct by stent    FINDINGS:    Abdomen and pelvis: Limited evaluation of intra-abdominal structures  due to the absence of intravenous contrast.  There is air within the  gallbladder wall with surrounding fat stranding and fluid concerning  for emphysematous cholecystitis. Somewhat linear foci of air about the  cystic duct and adjacent to the gallbladder fundus, concerning for  venous gas. Additionally, there is increased moderate pneumobilia,  particularly along the extrahepatic biliary ducts, left hepatic lobe,  and segments 4A and 4B. There is no distinct hepatic lesion. 4 cm  pancreatic head mass with a biliary stent in place, similar to prior.  Grossly unchanged mildly prominent regional lymph nodes in the upper  abdomen. Normal noncontrast appearance of the spleen, adrenals, bowel,  bladder, and prostate. Normal appendix. Right cortical renal cyst.  Otherwise unremarkable kidneys without urinary tract dilatation. Trace  atherosclerosis of the intra-abdominal vessels without aneurysmal  dilation, unremarkable noncontrast visualization of the  intra-abdominal vessels otherwise. Trace fluid collecting along the  right paracolic gutter. There is no evidence of free intraperitoneal  air.    Lung bases:  Bibasilar subsegmental atelectasis. 5 mm solid nodule in  the right middle lobe (series 3 image 16), new from outside CT  1/24/2022. Otherwise unremarkable lower chest.    Bones and soft tissues: No suspicious osseous lesions. Mild to  moderate degenerative changes of the lumbar spine and hips. Small fat  containing bilateral inguinal hernias.      Impression    IMPRESSION:   1. Emphysematous cholecystitis with moderate pneumobilia. Linear foci  of air adjacent to the gallbladder is likely within the veins.   2. Unchanged appearance of pancreatic head mass with biliary stent in  place.  3. 5 mm solid nodule in the right middle lobe, unchanged from  3/21/2023 although new from 1/24/2022. In the setting of pancreatic  cancer, this could represent a small metastatic lesion. Consider 3  month chest CT follow-up for evaluation of stability versus attention  on  potential follow-up CTs.    [Urgent Result: Emphysematous cholecystitis]    Finding was identified on 3/22/2023 4:40 PM.     Findings were communicated to Dr. Ibanez by Dr. Evelin Boo at  5:17PM.     I have personally reviewed the examination and initial interpretation  and I agree with the findings.    ROBIN PATTON MD         SYSTEM ID:  [id number]

## 2023-03-23 NOTE — PLAN OF CARE
"Goal Outcome Evaluation:    /68 (BP Location: Right arm)   Pulse 94   Temp 98.8  F (37.1  C) (Oral)   Resp 16   Ht 1.778 m (5' 10\")   Wt 100.8 kg (222 lb 3.2 oz)   SpO2 97%   BMI 31.88 kg/m      [id number]:     Activity: Up with assist of 1. Up in chair and to bathroom.  Pain: Denies pain. Took scheduled tylenol.  Neuro: A&O x4. Denies numbness, tingling and headache.  Cardiac: Denies chest pain.  Respiratory: Denies shortness of breath.   GI/: Voiding spontaneously. No BM this shift. Denies nausea and vomiting.  Diet: Low fat. Good appetite.  Lines: 2-PIV's, saline locked.  Skin: Jaundice. PCT lower right abdomen. Dressing clean, dry and intact.  Labs/imaging: BG-231 at 1700. BG- 214 at 2200.          Continue to monitor and follow POC  "

## 2023-03-24 VITALS
DIASTOLIC BLOOD PRESSURE: 55 MMHG | BODY MASS INDEX: 32.56 KG/M2 | TEMPERATURE: 98.5 F | WEIGHT: 227.4 LBS | RESPIRATION RATE: 18 BRPM | SYSTOLIC BLOOD PRESSURE: 107 MMHG | OXYGEN SATURATION: 97 % | HEART RATE: 87 BPM | HEIGHT: 70 IN

## 2023-03-24 LAB
ALBUMIN SERPL BCG-MCNC: 2.5 G/DL (ref 3.5–5.2)
ALP SERPL-CCNC: 157 U/L (ref 40–129)
ALT SERPL W P-5'-P-CCNC: 99 U/L (ref 10–50)
ANION GAP SERPL CALCULATED.3IONS-SCNC: 11 MMOL/L (ref 7–15)
AST SERPL W P-5'-P-CCNC: 100 U/L (ref 10–50)
BILIRUB SERPL-MCNC: 2.5 MG/DL
BUN SERPL-MCNC: 28.8 MG/DL (ref 6–20)
CALCIUM SERPL-MCNC: 9 MG/DL (ref 8.6–10)
CHLORIDE SERPL-SCNC: 103 MMOL/L (ref 98–107)
CREAT SERPL-MCNC: 1.08 MG/DL (ref 0.67–1.17)
DEPRECATED HCO3 PLAS-SCNC: 24 MMOL/L (ref 22–29)
ERYTHROCYTE [DISTWIDTH] IN BLOOD BY AUTOMATED COUNT: 12.6 % (ref 10–15)
GFR SERPL CREATININE-BSD FRML MDRD: 81 ML/MIN/1.73M2
GLUCOSE BLDC GLUCOMTR-MCNC: 144 MG/DL (ref 70–99)
GLUCOSE BLDC GLUCOMTR-MCNC: 180 MG/DL (ref 70–99)
GLUCOSE SERPL-MCNC: 151 MG/DL (ref 70–99)
HCT VFR BLD AUTO: 33.2 % (ref 40–53)
HGB BLD-MCNC: 10.7 G/DL (ref 13.3–17.7)
MCH RBC QN AUTO: 29.9 PG (ref 26.5–33)
MCHC RBC AUTO-ENTMCNC: 32.2 G/DL (ref 31.5–36.5)
MCV RBC AUTO: 93 FL (ref 78–100)
PLATELET # BLD AUTO: 176 10E3/UL (ref 150–450)
POTASSIUM SERPL-SCNC: 3.7 MMOL/L (ref 3.4–5.3)
PROT SERPL-MCNC: 6.2 G/DL (ref 6.4–8.3)
RBC # BLD AUTO: 3.58 10E6/UL (ref 4.4–5.9)
SODIUM SERPL-SCNC: 138 MMOL/L (ref 136–145)
WBC # BLD AUTO: 6.2 10E3/UL (ref 4–11)

## 2023-03-24 PROCEDURE — 250N000011 HC RX IP 250 OP 636: Performed by: STUDENT IN AN ORGANIZED HEALTH CARE EDUCATION/TRAINING PROGRAM

## 2023-03-24 PROCEDURE — 250N000013 HC RX MED GY IP 250 OP 250 PS 637: Performed by: STUDENT IN AN ORGANIZED HEALTH CARE EDUCATION/TRAINING PROGRAM

## 2023-03-24 PROCEDURE — 99231 SBSQ HOSP IP/OBS SF/LOW 25: CPT | Mod: 24 | Performed by: INTERNAL MEDICINE

## 2023-03-24 PROCEDURE — 85027 COMPLETE CBC AUTOMATED: CPT

## 2023-03-24 PROCEDURE — 99232 SBSQ HOSP IP/OBS MODERATE 35: CPT | Performed by: PHYSICIAN ASSISTANT

## 2023-03-24 PROCEDURE — 36415 COLL VENOUS BLD VENIPUNCTURE: CPT

## 2023-03-24 PROCEDURE — 99239 HOSP IP/OBS DSCHRG MGMT >30: CPT | Mod: FS | Performed by: STUDENT IN AN ORGANIZED HEALTH CARE EDUCATION/TRAINING PROGRAM

## 2023-03-24 PROCEDURE — 80053 COMPREHEN METABOLIC PANEL: CPT

## 2023-03-24 RX ORDER — METRONIDAZOLE 500 MG/1
500 TABLET ORAL 3 TIMES DAILY
Qty: 21 TABLET | Refills: 0 | Status: SHIPPED | OUTPATIENT
Start: 2023-03-24 | End: 2023-03-24

## 2023-03-24 RX ORDER — OXYCODONE HYDROCHLORIDE 5 MG/1
5 TABLET ORAL EVERY 6 HOURS PRN
Qty: 12 TABLET | Refills: 0 | Status: CANCELLED | OUTPATIENT
Start: 2023-03-24 | End: 2023-03-27

## 2023-03-24 RX ORDER — OXYCODONE HYDROCHLORIDE 5 MG/1
5-10 TABLET ORAL EVERY 6 HOURS PRN
Qty: 15 TABLET | Refills: 0 | Status: CANCELLED | OUTPATIENT
Start: 2023-03-24 | End: 2023-03-28

## 2023-03-24 RX ORDER — LEVOFLOXACIN 750 MG/1
750 TABLET, FILM COATED ORAL DAILY
Qty: 7 TABLET | Refills: 0 | Status: SHIPPED | OUTPATIENT
Start: 2023-03-24 | End: 2023-03-24

## 2023-03-24 RX ORDER — OXYCODONE HYDROCHLORIDE 5 MG/1
5 TABLET ORAL EVERY 6 HOURS PRN
Qty: 15 TABLET | Refills: 0 | Status: SHIPPED | OUTPATIENT
Start: 2023-03-24

## 2023-03-24 RX ADMIN — METOPROLOL SUCCINATE 25 MG: 25 TABLET, EXTENDED RELEASE ORAL at 08:10

## 2023-03-24 RX ADMIN — PIPERACILLIN AND TAZOBACTAM 4.5 G: 4; .5 INJECTION, POWDER, FOR SOLUTION INTRAVENOUS at 01:49

## 2023-03-24 RX ADMIN — INSULIN ASPART 1 UNITS: 100 INJECTION, SOLUTION INTRAVENOUS; SUBCUTANEOUS at 08:27

## 2023-03-24 RX ADMIN — OXYCODONE HYDROCHLORIDE 5 MG: 5 TABLET ORAL at 01:49

## 2023-03-24 RX ADMIN — PIPERACILLIN AND TAZOBACTAM 4.5 G: 4; .5 INJECTION, POWDER, FOR SOLUTION INTRAVENOUS at 08:10

## 2023-03-24 RX ADMIN — ACETAMINOPHEN 975 MG: 325 TABLET ORAL at 08:10

## 2023-03-24 RX ADMIN — INSULIN GLARGINE 15 UNITS: 100 INJECTION, SOLUTION SUBCUTANEOUS at 08:10

## 2023-03-24 RX ADMIN — ALLOPURINOL 100 MG: 100 TABLET ORAL at 08:10

## 2023-03-24 ASSESSMENT — ACTIVITIES OF DAILY LIVING (ADL)
ADLS_ACUITY_SCORE: 20

## 2023-03-24 NOTE — PROGRESS NOTES
GI Progress Note  Date of Service:  3/24/2023      Assessment:   Pancreatic adenocarcinoma with obstructive jaundice s/p biliary stent 3/14/2023  Stent malfunction with cholangitis s/p ERCP with clearance of sludge and stent dilation 3/21/2023  Suspected emphysematous cholecystitis      57 year old male with a history of  pancreatic adenocarcinoma (dx 3/2023, s/p biliary stent placement on 3/14/23, plan for potential neoadjuvant therapy and potential surgery) who was brought to the ER by his wife due to persistent abd pain.  CT imaging was negative for any stent migration.  Clinical picture was concerning for developing cholangitis, pt was started on antibiotics and an ERCP was done on 3/21/23, copious amounts of sludge was swept from the duct, stent was dialted to upto 8 mm.      Post ERCP pt continued to report right sided abdominal pain.  Xray abdomen was obtained as a next step and this showed evidence of gas within the gallbladder wall concerning for emphysematous cholecystitis., this was confirmed on CT. After discussion with surgical oncology, IR decision was made to pursue percutaneous drainage in order to allow for resumption of neoadjuvant chemotherapy as soon as possible.    Pt is s/p percutaneous cholecystostomy on 3/23/2023. He reported that he felt much better after the tube has been placed. The right lower abd pain has resolved, he does have some upper abd discomfort which has been there for a while and is likely related to the tumor. No fevers or chills. He has been tolerating diet.     Recommendations  -- Agree with oral antibiotics  -- Follow up as outpatient with oncology and surgical oncology.   -- No need for any planned GI endoscopic intervention unless there is concern for stent malfunction (fever/chills/jaundice/confusion). Discussed this with the patient and his spouse.      Patient was discussed with Dr. Yeni Rojas MD   Fellow   Gastroenterology & Hepatology  "    __________________________________________________________________________________  Subjective:  Nursing notes reviewed and noted.  Pt reports feeling much better after percutaneous tube placement  No fevers or chills       Physical Examination:  Vital Signs:  /55   Pulse 87   Temp 98.5  F (36.9  C) (Oral)   Resp 18   Ht 1.778 m (5' 10\")   Wt 103.1 kg (227 lb 6.4 oz)   SpO2 97%   BMI 32.63 kg/m       General:  Pt in no acute distress  HEENT:  Sclera icteric  Chest:  Non labored breathing   Cardiovascular: RRR.   Abdomen:  Soft, mild upper abd tenderness, percutaneous cholecystostomy tube +ve   Extremities:  No peripheral edema.   Neurological: Alert, calm and oriented, following commands    DATA:  Labs:    Reviewed and noted            "

## 2023-03-24 NOTE — PROVIDER NOTIFICATION
Provider Notification:    Re: Critical lab- Gallbladder aspirate growing Clostridium perfringens. Pt planning to discharge by 3pm, plz advise if this changes things. Thanks!    Action: Notified Jaylon Pugh MD at #[phone]    Response:

## 2023-03-24 NOTE — PROGRESS NOTES
Surgical Oncology Progress Note    Interval History:  No acute events overnight. Afebrile. Pain controlled. Tolerating diet without increased pain, nausea or vomiting. Feeling ready for discharge.      Physical Exam:   Temp:  [98.3  F (36.8  C)-98.8  F (37.1  C)] 98.3  F (36.8  C)  Pulse:  [83-94] 83  Resp:  [16-19] 16  BP: (105-146)/(66-78) 146/78  SpO2:  [92 %-98 %] 98 %  General: Alert, oriented, appears comfortable, NAD.  Respiratory: breathing non labored  Abdomen: Abdomen is soft, non-tender, non-distended. Drain with dark bilious output.     Data:   All laboratory and imaging data in the past 24 hours reviewed  I/O last 3 completed shifts:  In: 10 [Other:10]  Out: 1190 [Urine:1100; Drains:90]  Recent Labs   Lab Test 03/24/23  0540 03/23/23  0100 03/22/23  1709 03/22/23  0614 03/21/23  0322 03/20/23  0955   WBC 6.2 12.2* 12.5*  --    < > 8.4   HGB 10.7* 11.9* 13.0*  --    < > 14.6    228 215  --    < > 338   INR  --   --  1.49* 1.40*  --  1.05    < > = values in this interval not displayed.      Recent Labs   Lab Test 03/24/23  0813 03/24/23  0540 03/24/23  0240 03/23/23  0939 03/23/23  0553 03/22/23  1734 03/22/23  1709   NA  --  138  --   --  136  --  136   POTASSIUM  --  3.7  --   --  4.4  --  4.6   CHLORIDE  --  103  --   --  100  --  99   CO2  --  24  --   --  24  --  23   BUN  --  28.8*  --   --  27.9*  --  28.8*   CR  --  1.08  --   --  1.07  --  1.16   ANIONGAP  --  11  --   --  12  --  14   ARSH  --  9.0  --   --  9.5  --  9.3   * 151* 180*   < > 195*   < > 262*    < > = values in this interval not displayed.      Recent Labs   Lab Test 03/24/23  0540   PROTTOTAL 6.2*   ALBUMIN 2.5*   BILITOTAL 2.5*   ALKPHOS 157*   *   ALT 99*     Assessment and Plan:     Slick Lizarraga is a 57 year old male with history of hypertension, obstructive sleep apnea, type 2 diabetes who was recently diagnosed pancreatic adenocarcinoma of the head of the pancreas. He underwent ERCP with common bile duct  stenting on 3/14. He was admitted 3/21 with worsening upper abdominal pain concerning for cholangitis. CT demonstrated air in the gallbladder wall. He was started on Zosyn. ERCP performed 3/21 and stent was dilated. PTC drain was placed 3/23/23.      Doing well and ready for discharge.     - Appreciate Medicine Team cares.   - Recommend switching to Augmentin for a 7 days course from PTC placement 3/23/23.   - Continue PTC drain and saline 10 ml flushes once daily. He will need to be discharged with saline flushes dispensed from our hospital pharmacy (Rx order placed)  - Follow up next week in [place] with Dr. Echevarria with medical oncology for port placement and initiation of chemotherapy (suggest folfirinox). We will send a message to Dr. Echevarria.  - Follow up with Dr. Farooq virtually next week 3/30/23, will arrange.     Seen with Dr. Farooq.     Mago Heath PA-C   Surgical Oncology

## 2023-03-24 NOTE — DISCHARGE SUMMARY
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   Cholangitis due to biliary stent obstruction s/p stent exchange (3/21/23)  Pancreatic adenocarcinoma with obstruction s/p biliary stent (3/14/23)  Clostridium perfringens, polymicrobial in biliary fluid   Type 2 Diabetes (A1c 8.1%) with hyperglycemia  0.9 cm low-attenuation focus in the left lobe of the liver, unknown etiology, stable    Follow-ups Needed After Discharge    1) Follow up with PCP within 7 days for hospital follow up: address pain, glucose control, mood, consider health psychology consultation  2) Low-attenuation focus in the left lobe of the liver: consider MRI to follow up outpatient   3) Follow up with medical oncologist Dr. Echevarria next week for port placement and initiation of chemotherapy.   4) Follow up with Dr. Farooq of Surgical oncology for a virtual visit 3/30/23    Unresulted Labs Ordered in the Past 30 Days of this Admission     Date and Time Order Name Status Description    3/23/2023  8:54 AM Anaerobic Bacterial Culture Routine In process     3/23/2023  8:49 AM Aspirate Aerobic Bacterial Culture Routine with Gram Stain Preliminary     3/22/2023 10:01 PM Blood Culture Hand, Right Preliminary     3/21/2023 12:18 PM Blood Culture Hand, Right Preliminary     3/21/2023 12:18 PM Blood Culture Hand, Left Preliminary       These results will be followed up by primary care provider.     Discharge Disposition   Discharged to home  Condition at discharge: Stable    Hospital Course   Slick Lizarraga is a 57 year old man with a history of recently diagnosed pancreatic adenocarcinoma, biliary stent placement  3/14/23, HTN, type 2 diabetes, gout, and DAYAMI who presented with epigastric abdominal pain for 12 hours found to have severe sepsis presumed secondary to cholangitis s/p biliary stent exchange and evidence of emphysematous cholecystitis on imaging s/p percutaneous cholecystostomy tube.      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 He had lactic acidosis to 3, leukocytosis and underwent emergent ERCP 3/21 which showed obstruction of stent with food material and sludge/stone which was removed. He was started on IV zosyn 3/21. He had persistent abdominal pain and XR abdomen and subsequent CT showed emphysematous cholecystitis likely from stent causing obstruction of cystic duct. Surgical oncology followed patient closely and after discussion of risk and benefits, patient elected to pursue percutaneous cholecystostomy tube rather than cholecystectomy due to concern that surgery would delay chemotherapy. He was discharged on PO Augmentin for 7 additional days, and continued on low fat diet.  On day of discharge patient was tolerating diet, ambulating, pain was managed on PO medications, and liver tests were overall down-trending.   - Follow up with medical oncology in [place] for chemotherapy initiation   - Follow up with surgical oncology virtually at [facility]   - Continue percutaneous cholecystostomy tube for at least 6 weeks for healing, and likely will stay in until definitive surgical management (will flush 10 mL saline one daily)      Type 2 Diabetes (A1c 8.1%) with  hyperglycemia: Home regimen (metformin, glipizide and semaglutide) was held initially and was started on glargine 15u, then home regimen was resumed on discharge and glargine was discontinued. Informed patient to monitor blood sugars closely while on glipizide and levofloxacin.      0.9 cm low-attenuation focus in the left lobe of the liver, unknown etiology: Found on CT scan, it is nonspecific and not significantly changed from prior study.   - Consider MR of liver outpatient for further evaluation     Consultations This Hospital Stay   GI PANCREATICOBILIARY ADULT IP CONSULT  PHARMACY IP CONSULT  NURSING TO CONSULT FOR VASCULAR ACCESS CARE IP CONSULT  NUTRITION SERVICES ADULT IP CONSULT  PHYSICAL THERAPY ADULT IP CONSULT  OCCUPATIONAL THERAPY ADULT IP CONSULT  SURGICAL ONCOLOGY ADULT IP CONSULT  INTERVENTIONAL RADIOLOGY ADULT/PEDS IP CONSULT  PATIENT LEARNING CENTER IP CONSULT  PSYCHOLOGY ADULT IP CONSULT    Code Status   Full Code     The patient was discussed with Dr. Alaniz.     Jaylon Pugh MD  Internal Medicine, PGY-3     [address] [place] [id number]  Phone: [phone]  ______________________________________________________________________    Physical Exam   Vital Signs: Temp: 98.5  F (36.9  C) Temp src: Oral BP: 107/55 Pulse: 87   Resp: 18 SpO2: 97 % O2 Device: None (Room air)    Weight: 227 lbs 6.4 oz  General Appearance: Sitting up in bed in NAD, jaundiced  Eyes: icteric conjunctiva  Respiratory: normal WOB on ambient air  Cardiovascular: normal rate, regular rhythm   GI: soft, distended, tender to palpation in RUQ, epigastric area, no rebound or guarding, perc demetri tube draining dark green liquid  Skin: jaundiced   Neuro: Alert and oriented to person place time and situation, conversant      Primary Care Physician   Mark Henning    Discharge Orders      Adult [facility] Follow-up and recommended labs and tests    - Follow up with  "medical oncologist Dr. Echevarria next week for port placement and initiation of chemotherapy.   - Follow up with Dr. Farooq for a virtual visit 3/30/23.    Appointments on [place] and/or [facility] (with [facility] or [facility] provider or service). Call [phone] if you haven't heard regarding these appointments within 7 days of discharge.     Tubes and drains    You are going home with the following tubes or drains: biliary drain  - monitor and record output  - flush drain daily with 10 ml of sterile saline.     Discharge Instructions    If your travel plans upon discharge include prolonged periods of sitting (a lengthy car or plane ride), it is highly beneficial to get up and walk at least once per hour to help prevent swelling and blood clots.     You may shower, let warm soapy water run over the drain site and pat dry. Do not submerge or soak drain site.     Do not drive until you can with stand pressing the brake pedal quickly and fully without pain and you are not distracted by pain.     Call for fever greater than 101.5, chills, red skin around drain, drainage from around the drain, increased swelling around the drain, bleeding from the the drain site that is not controlled with light pressure, inability to tolerate diet,new nausea/vomiting, change in the drain out put character (bloody or purulent), other new/worsening symptoms.   You may also call the surgical oncology nurse care coordinator from 8:00am-4:30pm JOHN Uribe at [phone]. For after hours questions or concerns you can contact the on-call surgical oncology resident (nights and weekends [phone] and ask \"I would like to page the Surgical Oncology Resident on call.\"). In emergencies, call [phone].     Follow Up:  Follow up in clinic with Dr. Echevarria next week for port placement and initiation of chemotherapy. Follow up with Dr. Farooq 3/30/23. You should be called to make an appointment within 3 business days. If you are not contacted, " call [phone] to make an appointment.     Reason for your hospital stay    You were hospitalized for a gallbladder infection and clogged stent. You had a procedure to exchange your stent and had a tube placed to drain the gallbladder. You were seen by the surgical oncology team and will need to follow up with your medical oncologist in [place], who they will coordinate with. We will send you some medication for pain and antibiotics for the infection. If you notice any signs of fevers, chills, worsening abdominal pain, confusion, please make sure to return to the emergency room for evaluation. You can restart your home diabetes medications when you leave and will not need to be on insulin.     Activity    Your activity upon discharge: activity as tolerated and ambulate in house     Adult [facility] Follow-up and recommended labs and tests    Follow up with primary care provider, Mark Henning, within 7 days for hospital follow- up.  No follow up labs or test are needed.      Appointments on [place] and/or [facility] (with [facility] or [facility] provider or service). Call [phone] if you haven't heard regarding these appointments within 7 days of discharge.     Diet    Follow this diet upon discharge: Low fat       Significant Results and Procedures   Most Recent 3 CBC's:Recent Labs   Lab Test 03/24/23  0540 03/23/23  0100 03/22/23  1709   WBC 6.2 12.2* 12.5*   HGB 10.7* 11.9* 13.0*   MCV 93 90 91    228 215     Most Recent 3 BMP's:Recent Labs   Lab Test 03/24/23  0813 03/24/23  0540 03/24/23  0240 03/23/23  0939 03/23/23  0553 03/22/23  1734 03/22/23  1709   NA  --  138  --   --  136  --  136   POTASSIUM  --  3.7  --   --  4.4  --  4.6   CHLORIDE  --  103  --   --  100  --  99   CO2  --  24  --   --  24  --  23   BUN  --  28.8*  --   --  27.9*  --  28.8*   CR  --  1.08  --   --  1.07  --  1.16   ANIONGAP  --  11  --   --  12  --  14   ARSH  --  9.0  --   --  9.5  --  9.3   * 151* 180*   < > 195*    < > 262*    < > = values in this interval not displayed.     Most Recent 2 LFT's:Recent Labs   Lab Test 03/24/23  0540 03/23/23  0553   * 66*   ALT 99* 75*   ALKPHOS 157* 163*   BILITOTAL 2.5* 3.7*     Most Recent 3 INR's:Recent Labs   Lab Test 03/22/23  1709 03/22/23  0614 03/20/23  0955   INR 1.49* 1.40* 1.05       ERCP 3/21/22:   Impression:            - Selective biliary cannulation was performed                          - Stent had not expanded and was dilated to 8 mm                          - Copious amount of sludge/stone and food material was                          removed                          - No stent was placed through the pre-existing stent   Recommendation:        - [facility].                          - Will recommend to recheck LFTs and monitor clinical                          status in am                          - Will recommend to continue Zosyn 3.375 q6 hours                          - The findings and recommendations were discussed with                          the patient's family.                          - Inpatient pancreas-biliary consult service will                          closely follow patient and aid in further management                          decisions.       Results for orders placed or performed during the hospital encounter of 03/21/23   CT Abdomen Pelvis w Contrast    Narrative    EXAM: CT ABDOMEN AND PELVIS WITH CONTRAST  LOCATION: [facility]  DATE/TIME: 3/21/2023 5:18 AM    INDICATION: Recent diagnosis of pancreatic cancer. Left upper quadrant pain.  COMPARISON: 03/14/2023.    TECHNIQUE: CT scan of the abdomen and pelvis was performed following injection of IV contrast. Multiplanar reformats were obtained. Dose reduction techniques were used.  CONTRAST: 134 mL Isovue 370.    FINDINGS:    LOWER CHEST: Unremarkable.    HEPATOBILIARY: Very small 0.9 x 0.8 cm low-attenuation  focus in the lateral segment of the left lobe of the liver (series 4 image 118), not convincingly changed since the prior study, allowing for differences in technique. A stent is present in the   common bile duct. Pneumobilia is present in the liver, within normal limits given the presence of the stent. The gallbladder is moderately distended and contains gas within its lumen that likely relates to the stent. No convincing pericholecystic   inflammatory changes.     SPLEEN: Mild splenomegaly again noted. The spleen measures 14.5 cm in craniocaudal dimension.    PANCREAS: 4.4 x 4.1 cm hypoenhancing mass centered in the pancreatic head (series 4 image 146) again noted and consistent with known pancreatic neoplasm. There is atrophy of the more peripheral pancreas in addition to moderate to marked dilatation of the   pancreatic duct peripheral to the aforementioned mass. The mass abuts the main portal vein at the confluence with the superior mesenteric and splenic veins and surrounds the gastroduodenal artery.    ADRENAL GLANDS: Unremarkable.    KIDNEYS/BLADDER: No suspicious renal lesions.    BOWEL: Normal appendix.    LYMPH NODES: A few borderline and mildly enlarged lymph nodes are present in the fat about the pancreatic head. For example, there is a 1.4 x 1.1 cm lymph node inferior to the uncinate process of the pancreas (series 4 image 192). A few nonenlarged lymph   nodes are present adjacent to the distal esophagus and in the gastrohepatic ligament.    PELVIC ORGANS: Mild enlargement of the prostate gland.    MUSCULOSKELETAL: No acute findings.    OTHER: Atherosclerotic calcification in the abdominal aorta.      Impression    IMPRESSION:   1.  No convincing cause of acute pain identified in the abdomen or pelvis.  2.  4 cm pancreatic head mass again noted and consistent with the known pancreatic neoplasm.  3.  A few borderline and mildly enlarged peripancreatic lymph nodes in addition to a few nonenlarged lymph  nodes elsewhere in the upper abdomen. These are nonspecific, but could represent metastatic neoplasm.  4.  0.9 cm low-attenuation focus in the left lobe of the liver, not convincingly changed since the comparison study, allowing for differences in technique. This is nonspecific, but a hepatic metastasis is a possibility. If clinically relevant, an MR of   the liver could be considered for further evaluation.     US Abdomen Limited Portable    Narrative    EXAMINATION: Limited Abdominal Ultrasound, 3/21/2023 11:11 AM     COMPARISON: CT abdomen pelvis 3/21/2023    HISTORY: Right upper quadrant pain. History of biliary colic and  biliary stent.    FINDINGS:   Fluid: No evidence of ascites or pleural effusions.    Liver: The liver demonstrates increased echogenicity, measuring 24 cm  in craniocaudal dimension, enlarged. There is no focal mass. Extensive  pneumobilia throughout the liver present.    Gallbladder: Not visualized sonographically. This may be obscured  given pneumobilia within the gallbladder lumen on CT making it  difficult to separate from adjacent bowel loops. Patient was  reportedly tender on scanning however this appeared to be more related  to the midline and left hepatic lobe, not centered over the  gallbladder fossa.    Bile Ducts: Extensive pneumobilia.  The common bile duct is difficult  to identify. A small portion of duct measuring 2 mm is noted. The  biliary stent on CT is not visualized sonographically.    Pancreas: Obscured due to midline shadowing gas.    Kidney: The right kidney measures 13.3 cm long. There is no  hydronephrosis or hydroureter, no shadowing renal calculi, cystic  lesion or mass.       Impression    IMPRESSION:   1.  Difficult sonographic evaluation with poor visualization of  midline structures.  2.  Liver is hyperechoic suggesting hepatic steatosis. Hepatomegaly.  The lesion described on CT is not visualized sonographically. Patient  was reportedly tender over the left  hepatic lobe during scanning.  3.  Pneumobilia throughout the liver. Common bile duct difficult to  identify but does not appear significantly enlarged.  4.  Gallbladder not visualized sonographically, possibly obscured due  to pneumobilia within the lumen on CT making it difficult to separate  from adjacent bowel loops.    TEAGAN MCCULLOUGH MD         SYSTEM ID:  [id number]   XR Chest 2 Views    Narrative    Exam: XR CHEST 2 VIEWS, 3/21/2023 2:16 PM    Comparison: CT abdomen and pelvis 03/21/2023 and CT chest abdomen and  pelvis 03/14/2023    History: Evaluate intrapulmonary source of infection    Findings:  PA and lateral views of the chest. Trachea is midline.  Cardiomediastinal silhouette is within normal limits. Mild streaky  bibasilar atelectasis. No focal consolidation. There is no  pneumothorax or pleural effusion. Partially visualized biliary stent.  No acute osseous abnormality. Degenerative changes throughout the  visualized spine and severe degenerative changes of the bilateral  acromioclavicular joint spaces..      Impression    Impression:   Mild streaky bibasilar atelectasis. No focal consolidation.    I have personally reviewed the examination and initial interpretation  and I agree with the findings.    GREG DELUCA MD         SYSTEM ID:  [id number]   XR Surgery CRISPIN L/T 5 Min Fluoro w Stills    Narrative    This exam was marked as non-reportable because it will not be read by a   radiologist or a [place] non-radiologist provider.         XR Abdomen Port 1 View     Value    Radiologist flags suspected emphysematous cholecystitis (AA)    Narrative    Exam: XR ABDOMEN PORT 1 VIEW, 3/22/2023 12:47 PM    Indication: Eval perforation, hx ERCP and biliary stent    Comparison: 3/14/2023 CT    Findings:   Supine view the abdomen. Linear air along the gallbladder wall.  Metallic stent projects over the jostin hepatis. There is pneumobilia.  Bowel gas pattern is nonobstructive. Lung bases are clear. Heart  size  is normal.      Impression    Impression: Linear air along the gallbladder wall, suspicious for  emphysematous cholecystitis. Stable position of metallic biliary  stent. Consider CT abdomen for confirmation.    [Critical Result: suspected emphysematous cholecystitis]    Finding was identified on 3/22/2023 12:48 PM.      was contacted by Dr. Donavon MD at 3/22/2023 2:19 PM and  verbalized understanding of the critical finding.     I have personally reviewed the examination and initial interpretation  and I agree with the findings.    ROBIN PATTON MD         SYSTEM ID:  [id number]   CT Abdomen Pelvis w/o Contrast     Value    Radiologist flags Emphysematous cholecystitis (Urgent)    Narrative    EXAMINATION: CT ABDOMEN PELVIS W/O CONTRAST, 3/22/2023 4:23 PM    TECHNIQUE:  Helical CT images from the lung bases through the pubic  symphysis were obtained  without IV contrast.     COMPARISON: ct 3/21/2023    HISTORY: 57M with pancreatic adenocarcinoma s/p metal biliary stent  with abdominal pain suspicious for acute cholecystitis due to possible  occlusion of cystic duct by stent    FINDINGS:    Abdomen and pelvis: Limited evaluation of intra-abdominal structures  due to the absence of intravenous contrast. There is air within the  gallbladder wall with surrounding fat stranding and fluid concerning  for emphysematous cholecystitis. Somewhat linear foci of air about the  cystic duct and adjacent to the gallbladder fundus, concerning for  venous gas. Additionally, there is increased moderate pneumobilia,  particularly along the extrahepatic biliary ducts, left hepatic lobe,  and segments 4A and 4B. There is no distinct hepatic lesion. 4 cm  pancreatic head mass with a biliary stent in place, similar to prior.  Grossly unchanged mildly prominent regional lymph nodes in the upper  abdomen. Normal noncontrast appearance of the spleen, adrenals, bowel,  bladder, and prostate. Normal appendix. Right cortical  renal cyst.  Otherwise unremarkable kidneys without urinary tract dilatation. Trace  atherosclerosis of the intra-abdominal vessels without aneurysmal  dilation, unremarkable noncontrast visualization of the  intra-abdominal vessels otherwise. Trace fluid collecting along the  right paracolic gutter. There is no evidence of free intraperitoneal  air.    Lung bases:  Bibasilar subsegmental atelectasis. 5 mm solid nodule in  the right middle lobe (series 3 image 16), new from outside CT  1/24/2022. Otherwise unremarkable lower chest.    Bones and soft tissues: No suspicious osseous lesions. Mild to  moderate degenerative changes of the lumbar spine and hips. Small fat  containing bilateral inguinal hernias.      Impression    IMPRESSION:   1. Emphysematous cholecystitis with moderate pneumobilia. Linear foci  of air adjacent to the gallbladder is likely within the veins.   2. Unchanged appearance of pancreatic head mass with biliary stent in  place.  3. 5 mm solid nodule in the right middle lobe, unchanged from  3/21/2023 although new from 1/24/2022. In the setting of pancreatic  cancer, this could represent a small metastatic lesion. Consider 3  month chest CT follow-up for evaluation of stability versus attention  on potential follow-up CTs.    [Urgent Result: Emphysematous cholecystitis]    Finding was identified on 3/22/2023 4:40 PM.     Findings were communicated to Dr. Ibanez by Dr. Evelin Boo at  5:17PM.     I have personally reviewed the examination and initial interpretation  and I agree with the findings.    ROBIN PATTON MD         SYSTEM ID:  [id number]   IR Gallbladder Drain Placement    Narrative    PROCEDURES 3/23/2023:  1. Ultrasound guidance for venous access  2. Placement centrally inserted catheter (age > 5 yrs.) tunneled, no  port, no pump  3. Fluoroscopic guidance placement    Clinical History: 57-year-old male with pancreatic cancer recently  diagnosed, common bile duct stent. He has developed  emphysematous  cholecystitis and cholecystostomy tube placement is recommended as he  is not a surgical candidate for cholecystectomy at this time.    Comparisons: CT 3/22/2023    Staff Radiologist: Lola Chand MD.  I, Lola Chand,  performed the entire procedure.    Assistant: ZAYDA Pretty    Medications:   Patient is currently on IV Zosyn.  1% lidocaine for local anesthesia, 10 mL  Versed 2 mg IV  Fentanyl 150 mcg IV    Nursing:  The patient was placed on continuous monitoring. Intravenous  sedation was administered. Sedation time was 20 minutes. Attending  face-to-face time 20 minutes. Vital signs and sedation monitored by  nursing staff under Interventional Radiologists supervision. The  patient remained stable throughout the procedure.    Fluoroscopy time: 2.9 minutes    PROCEDURE: The patient understood the limitations, alternatives, and  risks of the procedure and requested the procedure be performed. Both  written and oral consent were obtained.    Patient was placed supine in procedure table. Right abdomen was  prepped and draped. Procedural timeout performed. Targeted ultrasound  of the abdomen delineated liver overlying the gallbladder which  demonstrated an hyperechogenic wall with posterior shadowing  consistent with gas. Spot fluoroscopic image of the abdomen  demonstrates the gallbladder containing air within the wall. Best  trajectory into the gallbladder was determined with combined  ultrasound and fluoroscopic visualization. 1% lidocaine used for local  anesthesia. Under ultrasound guidance, AccuStick needle guided by a  subcostal, transhepatic course into the body of the gallbladder. Small  amount of bile was produced from the needle. 0.018 inch nitinol wire  advanced and coiled within the gallbladder lumen under fluoroscopy.  AccuStick sheath placed over wire. Bentson wire coiled in gallbladder  and AccuStick sheath removed. The tract was dilated with an 8 Irish  fascial  dilator, and subsequently an 8 [place] [facility] locking  pigtail catheter was advanced over the wire and positioned within the  gallbladder lumen. The wire was removed. The pigtail was locked and  approximately 80 mL of bile was removed. A sample was sent for  culture. Small contrast injection reconfirmed position within the  gallbladder lumen.    The tube was attached to a gravity bags, flushed, secured with Ethilon  suture. A sterile dressing was applied.    No immediate complication.      Impression    IMPRESSION:   Combined ultrasound and fluoroscopic guided 8 Czech locking pigtail  percutaneous cholecystostomy tube placement for emphysematous  cholecystitis.    Plan:  1. Flush tube every 24 hours with 5 mL sterile saline.  2. Drain care teaching for patient and family today.  3. Cholecystostomy tube will need to be in place for a minimum of 6  weeks to allow for tract healing (this reduces risk of tract of  bleeding and bile leakage which can occur if the tube is removed to  early). This tube will likely stay in place until time of Whipple  procedure.    SERINA WATSON MD         SYSTEM ID:  [id number]       Discharge Medications   Current Discharge Medication List      START taking these medications    Details   levofloxacin (LEVAQUIN) 750 MG tablet Take 1 tablet (750 mg) by mouth daily for 7 days  Qty: 7 tablet, Refills: 0    Associated Diagnoses: Acute cholecystitis; Cholecystostomy care (H); Obstruction of biliary stent, initial encounter      metroNIDAZOLE (FLAGYL) 500 MG tablet Take 1 tablet (500 mg) by mouth 3 times daily for 7 days  Qty: 21 tablet, Refills: 0    Associated Diagnoses: Acute cholecystitis; Cholecystostomy care (H); Obstruction of biliary stent, initial encounter      sodium chloride, PF, 0.9% PF flush Irrigate with 10 mLs as directed every 24 hours  Qty: 300 mL, Refills: 0    Comments: Dispense as 10 ml sterile saline syringes.  Associated Diagnoses: Malignant neoplasm of pancreas,  unspecified location of malignancy (H)         CONTINUE these medications which have CHANGED    Details   oxyCODONE (ROXICODONE) 5 MG tablet Take 1-2 tablets (5-10 mg) by mouth every 6 hours as needed for moderate pain (4-6)  Qty: 15 tablet, Refills: 0    Associated Diagnoses: Acute cholecystitis; Cholecystostomy care (H); Obstruction of biliary stent, initial encounter         CONTINUE these medications which have NOT CHANGED    Details   acetaminophen (TYLENOL) 500 MG tablet Take 1,000 mg by mouth as needed      allopurinol (ZYLOPRIM) 100 MG tablet Take 1 tablet by mouth daily      CONTOUR NEXT TEST test strip daily before breakfast      glipiZIDE (GLUCOTROL) 10 MG tablet Take 10 mg by mouth 2 times daily (before meals)      hydrOXYzine (ATARAX) 10 MG tablet Take 10 mg by mouth 3 times daily as needed for itching      metFORMIN (GLUCOPHAGE) 1000 MG tablet Take 1 tablet by mouth 2 times daily (with meals)      metoprolol succinate ER (TOPROL XL) 25 MG 24 hr tablet Take 1 tablet by mouth daily      Microlet Lancets MISC daily before breakfast      semaglutide (OZEMPIC, 0.25 OR 0.5 MG/DOSE,) 2 MG/1.5ML SOPN pen Inject 0.5 mg Subcutaneous once a week On Sunday      sildenafil (REVATIO) 20 MG tablet Take 20 mg by mouth 1-5 tablet one half hour to 4 hours before sexual activity. Do not exceed1 00mg in 24 hours           Allergies   Allergies   Allergen Reactions     Azithromycin Hives     Erythromycin Rash

## 2023-03-24 NOTE — PLAN OF CARE
Time 4339-7358    Vitals: VSS on RA  Activity: SBA, Up ad corey in room  Pain: C/O abd pain - PRN Oxycodone given  Neuro: A&OX4    Cardiac:  WDL  Respiratory:  Denies cough/SOB  GI/: Voids spontaneously - tea colored, LBM 3/22  Diet: Low Fat   Lines: 2 L PIV SL  Skin/Wounds: Jaundice, RLQ PCT  Labs/imaging: Reviewed,         New changes this shift:  Stable overnight.     Plan: Continue pain management and IV antibiotics. Possible discharge today.     Continue to monitor and follow POC      Goal Outcome Evaluation:      Plan of Care Reviewed With: patient    Overall Patient Progress: improvingOverall Patient Progress: improving    Outcome Evaluation: Tolerating PO pain medication, RLQ PCT, Continue IV antibiotics

## 2023-03-24 NOTE — PLAN OF CARE
Goal Outcome Evaluation:      Plan of Care Reviewed With: patient, spouse    Overall Patient Progress: improvingOverall Patient Progress: improving     Pt having adequate pain control with tylenol.  Pt eating small amounts.  Urine remains dark brown and clear.  200 ml out of drain today.  Wife here and performed tube care and dressing change with RN.  Home supplies given to patient to get patient through the next two weeks.  Pt wife will order some supplies when they return to [place].  Discharge instructions went over with patient and wife.  Pt left unit with wife at 1540.  Discharge meds and flushes picked up in pharmacy.  Pt aware that MD changed antibiotic prior to discharge.

## 2023-03-25 LAB
BACTERIA ASPIRATE CULT: ABNORMAL
BACTERIA ASPIRATE CULT: ABNORMAL
GRAM STAIN RESULT: ABNORMAL
GRAM STAIN RESULT: ABNORMAL

## 2023-03-26 LAB
BACTERIA BLD CULT: NO GROWTH
BACTERIA BLD CULT: NO GROWTH

## 2023-03-27 ENCOUNTER — PATIENT OUTREACH (OUTPATIENT)
Dept: CARE COORDINATION | Facility: CLINIC | Age: 58
End: 2023-03-27
Payer: COMMERCIAL

## 2023-03-27 NOTE — PROGRESS NOTES
Clinic Care Coordination Contact  [facility]: Post-Discharge Note  The patient stated there was some confusion regarding how much fluid he is supposed to use to flush his drain. The patient stated that he was informed by the doctor to use 5 mL of fluid and he was informed to use 10 mL of fluid from the nurses. The discharge paperwork states 10 mL of fluid and also it states this with the medication list. Due to the confusion the CHW informed him that she will message her team of nurses to see if anyone can reach out to discuss.  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   Cholangitis due to biliary stent obstruction s/p stent exchange (3/21/23)  Pancreatic adenocarcinoma with obstruction s/p biliary stent (3/14/23)  Clostridium perfringens, polymicrobial in biliary fluid   Type 2 Diabetes (A1c 8.1%) with hyperglycemia  0.9 cm low-attenuation focus in the left lobe of the liver, unknown etiology, stable  Discharge:   Discharge Date: 03/24/23  Discharge Diagnosis: Severe sepsis, resolved  Septic encephalopathy  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   Cholangitis due to biliary stent obstruction s/p stent exchange (3/21/23)  Pancreatic adenocarcinoma with obstruction s/p biliary stent (3/14/23)  Clostridium perfringens, polymicrobial in biliary fluid   Type 2 Diabetes (A1c 8.1%) with hyperglycemia  0.9 cm low-attenuation focus in the left lobe of the liver, unknown etiology, stable    BACKGROUND                                                      Per hospital discharge summary and inpatient provider notes:    Slick Lizarraga is a 57 year old male with a PMH of recently diagnosed pancreatic adenocarcinoma, HTN, type 2 diabetes, gout, and DAYAMI who presents with abdominal pain for 12 hours.  "     He was recently diagnosed with pancreatic adenocarcinoma and has not yet begun chemotherapy. He lives in [place] and has been here for 1 week getting workup and had an ERCP last week with biliary stent placement.      Yesterday (3/20/23) around 3 pm, he first noticed feeling hungrier than is typical. He subsequently began eating a meal and quickly noticed pain localized to the LUQ. He describes the pain as constant, but notes that it acutely became more uncomfortable following eating. Since that time, the pain has become more diffuse across the superior portion of his abdomen bilaterally and radiates superiorly to his right shoulder and back. He tried taking 3 oxycodone and did not have any relief. He describes having a previous episode of pancreatitis about two years ago that felt similar to his current pain, but he current episode is much worse than anything he had experienced in the past. He also notes a low appetite for the past 6-9 months and jaundice for the past 2-3 weeks.     In the ED, a GI cocktail was given without any relief. Basic labs demonstrated leukocytosis to 13.3, hyperglycemia to 333, AST/ALT to 80/112, AP to 264, lipase to 65, and normal troponin and INR. CT abdomen showed the previously identified 4 cm mass in the head of the pancreas with enlarged lymph nodes, but was otherwise unrevealing for an acute cause of pain. EKG was normal. US abdomen demonstrated hyperechoic liver suggesting hepatic steatosis, hepatomegaly, and pneumobilia throughout the liver. UA was ordered and pending at the time of admission.     Endorses increased thirst, light brown urine, upper and lower back pain.  Denies nausea, vomiting, diarrhea, fever, cough, SOB, dysuria.    ASSESSMENT      Discharge Assessment  How are you doing now that you are home?: \"The last few days have been good\"  How are your symptoms? (Red Flag symptoms escalate to triage hotline per guidelines): Improved  Do you feel your condition " "is stable enough to be safe at home until your provider visit?: Yes  Does the patient have their discharge instructions? : Yes  Does the patient have questions regarding their discharge instructions? : No  Were you started on any new medications or were there changes to any of your previous medications? : Yes  Does the patient have all of their medications?: Yes  Do you have questions regarding any of your medications? : Yes (see comment) (The patient was informed that he should be flushing the drain with 5 mL of fluid but what is stated in the discharge paperwork as \"flush drain daily with 10 ml of sterile saline\" ANGELI is asking for clarification)  Do you have all of your needed medical supplies or equipment (DME)?  (i.e. oxygen tank, CPAP, cane, etc.): Yes  Discharge follow-up appointment scheduled within 14 calendar days? : Yes  Discharge Follow Up Appointment Date: 03/29/23  Discharge Follow Up Appointment Scheduled with?: Specialty Care Provider    Post-op (ANGELI CTA Only)  If the patient had a surgery or procedure, do they have any questions for a nurse?: No    PLAN                                                      Outpatient Plan:    1) Follow up with PCP within 7 days for hospital follow up: address pain, glucose control, mood, consider health psychology consultation  2) Low-attenuation focus in the left lobe of the liver: consider MRI to follow up outpatient   3) Follow up with medical oncologist Dr. Echevarria next week for port placement and initiation of chemotherapy.   4) Follow up with Dr. Farooq of Surgical oncology for a virtual visit 3/30/23    No future appointments.      For any urgent concerns, please contact our 24 hour nurse triage line: [phone])         ANGELI Mendez  [phone]  [facility]    "

## 2023-03-27 NOTE — PROGRESS NOTES
RN called patient to discuss drain flush recommendations.  Per AVS and inpatient provider notes patient to flush drain with 10ml daily. Pt stated understanding.    Pt states they were given only 5ml syringes not 10ml syringes. RN advised patient to use 2 syringes each time for a total of 10ml.  Patient stated understanding.     If patient has concerns pt will contact clinic.     Elaina Duncan RN 03/27/23 2:33 PM  [facility] - Ambulatory Care Management

## 2023-03-28 LAB — BACTERIA BLD CULT: NO GROWTH

## 2023-03-30 ENCOUNTER — VIRTUAL VISIT (OUTPATIENT)
Dept: SURGERY | Facility: CLINIC | Age: 58
End: 2023-03-30
Attending: SURGERY
Payer: COMMERCIAL

## 2023-03-30 DIAGNOSIS — C25.3 ADENOCARCINOMA OF PANCREATIC DUCT (H): Primary | ICD-10-CM

## 2023-03-30 LAB — BACTERIA ASPIRATE CULT: ABNORMAL

## 2023-03-30 PROCEDURE — G0463 HOSPITAL OUTPT CLINIC VISIT: HCPCS | Mod: PN,GT | Performed by: SURGERY

## 2023-03-30 PROCEDURE — 99214 OFFICE O/P EST MOD 30 MIN: CPT | Mod: VID | Performed by: SURGERY

## 2023-03-30 NOTE — LETTER
3/30/2023         RE: Slick Lizarraga  [address] [facility] [address]        Dear Colleague,    Thank you for referring your patient, Slick Lizarraga, to the [facility]. Please see a copy of my visit note below.    Virtual Visit Details    Type of service:  Video Visit     Originating Location (pt. Location): Home    Distant Location (provider location):  On-site  Platform used for Video Visit: [facility]      Department of Surgery  Division of Surgical Oncology    Follow-Up  Mar 30, 2023    Slick Lizarraga is a 57 year old male who is being followed for a recent hospitalization for acute cholecystitis in the setting of newly diagnosed locally advanced pancreatic cancer.    He is joined today by his wife.    History of Present Illness   He has been doing well since discharge. His jaundice has continued to improve - labs checked locally showed bili of 2. Cholecystostomy tube output has decreased to less than 40mls combined in the last 48hrs, a significant drop off from immediately after discharge. No fevers or chills. He has remained on a lower fat diet but has started snacking on higher fat foods.      Physical Exam     I examined the drain fluid - clear with a slight bile tinge. About 30mls in the bag.    Results Reviewed:  Labs:  CMP and CBC in Care Everywhere from 3/29 reviewed      Assessment & Plan     Slick Lizarraga is a 57 year old male being followed for pancreas cancer and now with a percutaneous cholecystostomy tube in place, recently discharged to home    Recommendations:  -Ok to start chemotherapy from my standpoint  -Continue drain care:  Flush with 10ml NS once daily  Empty bag and record output at least once daily  Drain volume should remain low and fluid should remain clear; if volume picks up or color/consistency of fluid changes, please call our office  -If you have difficulty with the drain (leakage, not able to flush, concerns about it being dislodged), we recommend seeking  care locally or calling [facility] offices for assistance    Tentative plan would be to start at least one but probably 2 cycles of chemotherapy with the drain to the drainage bag. If chemotherapy is well-tolerated, we could consider capping the drain as a next-step before having it removed.    I will arrange for a repeat virtual visit after his second cycle of chemo.    I agree with the plan for an MRI to evaluate these liver lesions; our initial impression based on his CT scan was indeterminate although somewhat unlikely to be concerning, but his repeat CT during his admission for cholecystitis raised additional suspicion, which should be formally evaluated.    All questions were answered to their apparent satisfaction, and contact information was provided should additional questions or concerns arise.        Gray Farooq MD MPHS      Total time spent was 30 minutes, including but not limited to patient-facing time, chart review, review of tests/studies as noted above, and care coordination.

## 2023-03-30 NOTE — PROGRESS NOTES
Virtual Visit Details    Type of service:  Video Visit     Originating Location (pt. Location): Home    Distant Location (provider location):  On-site  Platform used for Video Visit: [facility]      Department of Surgery  Division of Surgical Oncology    Follow-Up  Mar 30, 2023    Slick Lizarraga is a 57 year old male who is being followed for a recent hospitalization for acute cholecystitis in the setting of newly diagnosed locally advanced pancreatic cancer.    He is joined today by his wife.    History of Present Illness   He has been doing well since discharge. His jaundice has continued to improve - labs checked locally showed bili of 2. Cholecystostomy tube output has decreased to less than 40mls combined in the last 48hrs, a significant drop off from immediately after discharge. No fevers or chills. He has remained on a lower fat diet but has started snacking on higher fat foods.      Physical Exam     I examined the drain fluid - clear with a slight bile tinge. About 30mls in the bag.    Results Reviewed:  Labs:  CMP and CBC in Care Everywhere from 3/29 reviewed      Assessment & Plan     Slick Lizarraga is a 57 year old male being followed for pancreas cancer and now with a percutaneous cholecystostomy tube in place, recently discharged to home    Recommendations:  -Ok to start chemotherapy from my standpoint  -Continue drain care:  Flush with 10ml NS once daily  Empty bag and record output at least once daily  Drain volume should remain low and fluid should remain clear; if volume picks up or color/consistency of fluid changes, please call our office  -If you have difficulty with the drain (leakage, not able to flush, concerns about it being dislodged), we recommend seeking care locally or calling [facility] offices for assistance    Tentative plan would be to start at least one but probably 2 cycles of chemotherapy with the drain to the drainage bag. If chemotherapy is well-tolerated, we  could consider capping the drain as a next-step before having it removed.    I will arrange for a repeat virtual visit after his second cycle of chemo.    I agree with the plan for an MRI to evaluate these liver lesions; our initial impression based on his CT scan was indeterminate although somewhat unlikely to be concerning, but his repeat CT during his admission for cholecystitis raised additional suspicion, which should be formally evaluated.    All questions were answered to their apparent satisfaction, and contact information was provided should additional questions or concerns arise.        Gray Farooq MD MPHS      Total time spent was 30 minutes, including but not limited to patient-facing time, chart review, review of tests/studies as noted above, and care coordination.

## 2023-03-30 NOTE — NURSING NOTE
Is the patient currently in the state of [place]? YES    Visit mode:VIDEO    If the visit is dropped, the patient can be reconnected by: VIDEO VISIT: Text to cell phone: [phone]    Will anyone else be joining the visit? YES: How would they like to receive their invitation?       How would you like to obtain your AVS? MyChart    Are changes needed to the allergy or medication list? NO    Reason for visit: follow up

## 2023-04-19 ENCOUNTER — PATIENT OUTREACH (OUTPATIENT)
Dept: ONCOLOGY | Facility: CLINIC | Age: 58
End: 2023-04-19
Payer: COMMERCIAL

## 2023-04-19 NOTE — PROGRESS NOTES
Surgical Oncology RN Care Coordination Note:     Called and left a message with patient oncology team requesting further work up of liver lesions with biopsy. Requested they arrange it locally if possible but if not then we can arrange for [facility] to [facility]. Requested a call back to confirm if this can be done and provided my direct line.     Natty Uribe RN, BSN  Care Coordinator

## 2023-04-19 NOTE — PROGRESS NOTES
Surgical Oncology RN Care Coordination Note:      Called and spoke with patient regarding recommendation for liver biopsy. Informed him that Dr. Farooq reviewed the MRI and recommended that we get biopsy as it would determine definitive plan of care related to surgery. Informed him I spoke with his local oncology office and they are willing to arrange for biopsy locally. Briefly discussed liver biopsy process and that they will call to schedule and likely go over prep and instruction. Informed him we will plan for a follow up visit about 1 week after biopsy to review results and finalized surgical follow up. He agreed with plan and is aware he will be contacted to schedule     Natty Uribe RN, BSN  Care Coordinator

## 2023-04-24 ENCOUNTER — VIRTUAL VISIT (OUTPATIENT)
Dept: SURGERY | Facility: CLINIC | Age: 58
End: 2023-04-24
Attending: SURGERY
Payer: COMMERCIAL

## 2023-04-24 DIAGNOSIS — C25.3 ADENOCARCINOMA OF PANCREATIC DUCT (H): Primary | ICD-10-CM

## 2023-04-24 DIAGNOSIS — Z43.4 CHOLECYSTOSTOMY CARE (H): ICD-10-CM

## 2023-04-24 PROCEDURE — 99214 OFFICE O/P EST MOD 30 MIN: CPT | Mod: VID | Performed by: SURGERY

## 2023-04-24 PROCEDURE — G0463 HOSPITAL OUTPT CLINIC VISIT: HCPCS | Mod: PN,95 | Performed by: SURGERY

## 2023-04-24 NOTE — LETTER
"    4/24/2023         RE: Slick Lizarraga  [address] [facility] [address]        Dear Colleague,    Thank you for referring your patient, Slick Lizarraga, to the [facility]. Please see a copy of my visit note below.    Virtual Visit Details    Type of service:  Video Visit   Video Start Time: 1050am  Video End Time:1105am    Originating Location (pt. Location): Home    Distant Location (provider location):  On-site  Platform used for Video Visit: [facility]      Department of Surgery  Division of Surgical Oncology    Follow-Up  Apr 24, 2023    Slick Lizarraga is a 57 year old male who is being followed for PDAC, complicated by cholecystitis with perc demetri tube in place.     He is joined today by his wife.    History of Present Illness   He has completed his first cycle of cycle and did experience some nausea and fatigue. He is due for his next cycle of chemo     Over the phone, I reviewed his recent labs from today, not yet available in CareEverywhere. His bilirubin is 0.8, AlkPhos is 114, AST 26, ALT 29. WBC 3.8.     They are still flushing his drain daily and reporting 5-10mls of clear serous fluid output. No fever or chills with eating. He did have a fatty meal recently - pizza - and \"felt unwell\" after that but denied fever or abdominal pain.    Physical Exam   - none  Results Reviewed:  Labs:  As above    I, Gray Farooq, personally reviewed the above studies.      Assessment & Plan     Slick Lizarraga is a 57 year old male being followed for a perc demetri tube in the setting of PDAC. CA 19-9 remains high    Plan Summary:  -I agree with plan for liver biopsy of suspicious liver lesions to evaluate if patient has metastatic cancer  -Continue with chemotherapy  -If patient tolerates tomorrows chemotherapy, I do believe it would be appropriate to attempt to remove his demetri tube. I sent a message to his local [facility] asking for their assistance. I would do this in the following way.    1. Chemo this " week  2. If chemo is well-tolerated, cap the drain approximately 1 week after chemo (mid-week the week of 5/1). Patient should continue flushing the drain daily regardless of whether the drain is capped or to a bag. Patient should have a backup bag available at home; if he has fever or chills or RUQ pain, he should uncap the drain and place it to a drainage bag  3. Repeat labs two weeks from today  4. Next cycle of chemo  5. 1 week later, if no clinical issues while drain is capped and normal labs, could consider having drain pulled locally    All questions were answered to their apparent satisfaction, and contact information was provided should additional questions or concerns arise.        Gray Farooq MD MPHS      Total time spent was 30 minutes, including but not limited to patient-facing time, chart review, review of tests/studies as noted above, and care coordination.

## 2023-04-24 NOTE — NURSING NOTE
Is the patient currently in the state of [place]? [place], [place]. Provider licensed.    Visit mode:VIDEO    If the visit is dropped, the patient can be reconnected by: VIDEO VISIT: Text to cell phone: [phone]    Will anyone else be joining the visit? NO      How would you like to obtain your AVS? MyChart    Are changes needed to the allergy or medication list? NO  Patient verified medications and allergies are correct via eCheck-in. Patient confirms no changes at this time and/or since last reviewed by clinic staff.    Reason for visit: Oncology Video Visit Return (Adenocarcinoma of pancreatic duct, f/u)  Slick Lizarraga 57 year old male presents today for f/u on pancreatic cancer.  Rachelle Roman, Virtual Facilitator

## 2023-04-24 NOTE — PROGRESS NOTES
Virtual Visit Details    Type of service:  Video Visit   Video Start Time: 1050am  Video End Time:1105am    Originating Location (pt. Location): Home    Distant Location (provider location):  On-site  Platform used for Video Visit: [employer]

## 2023-04-24 NOTE — PROGRESS NOTES
"  Department of Surgery  Division of Surgical Oncology    Follow-Up  Apr 24, 2023    Slick Lizarraag is a 57 year old male who is being followed for PDAC, complicated by cholecystitis with perc demetri tube in place.     He is joined today by his wife.    History of Present Illness   He has completed his first cycle of cycle and did experience some nausea and fatigue. He is due for his next cycle of chemo     Over the phone, I reviewed his recent labs from today, not yet available in CareEverywhere. His bilirubin is 0.8, AlkPhos is 114, AST 26, ALT 29. WBC 3.8.     They are still flushing his drain daily and reporting 5-10mls of clear serous fluid output. No fever or chills with eating. He did have a fatty meal recently - pizza - and \"felt unwell\" after that but denied fever or abdominal pain.    Physical Exam   - none  Results Reviewed:  Labs:  As above    I, Gray Farooq, personally reviewed the above studies.      Assessment & Plan     Slick Lizarraga is a 57 year old male being followed for a perc demetri tube in the setting of PDAC. CA 19-9 remains high    Plan Summary:  -I agree with plan for liver biopsy of suspicious liver lesions to evaluate if patient has metastatic cancer  -Continue with chemotherapy  -If patient tolerates tomorrows chemotherapy, I do believe it would be appropriate to attempt to remove his demetri tube. I sent a message to his local [facility] asking for their assistance. I would do this in the following way.    1. Chemo this week  2. If chemo is well-tolerated, cap the drain approximately 1 week after chemo (mid-week the week of 5/1). Patient should continue flushing the drain daily regardless of whether the drain is capped or to a bag. Patient should have a backup bag available at home; if he has fever or chills or RUQ pain, he should uncap the drain and place it to a drainage bag  3. Repeat labs two weeks from today  4. Next cycle of chemo  5. 1 week later, if no clinical issues while drain is capped " and normal labs, could consider having drain pulled locally    All questions were answered to their apparent satisfaction, and contact information was provided should additional questions or concerns arise.        Gray Farooq MD MPHS      Total time spent was 30 minutes, including but not limited to patient-facing time, chart review, review of tests/studies as noted above, and care coordination.

## 2023-05-08 ENCOUNTER — PATIENT OUTREACH (OUTPATIENT)
Dept: SURGERY | Facility: CLINIC | Age: 58
End: 2023-05-08
Payer: COMMERCIAL

## 2023-05-08 NOTE — PROGRESS NOTES
Surgical Oncology RN Care Coordination Note:     Called and left a message with patient care team at [facility] to get update on the status of a liver biopsy. Requested a call back to confirm they are still proceeding with getting this set up or if we need to arrange for this to be done at the [facility]. Provided my direct line for care team to call back with update.     Natty Uribe RN, BSN  Care Coordinator

## 2023-05-16 ENCOUNTER — PATIENT OUTREACH (OUTPATIENT)
Dept: ONCOLOGY | Facility: CLINIC | Age: 58
End: 2023-05-16
Payer: COMMERCIAL

## 2023-05-16 NOTE — PROGRESS NOTES
"[facility]: Surgical Oncology Cancer Care Short Note                                     Discussion with Patient:                                                       INBOUND CALL:     Received call from Smita, Slick's wife. She stated that Jami's gallbladder drain output has become a \"thick, butterscotch color with a foul odor.\" This started about two weeks ago. Prior to two weeks ago, output was thinner, like \"chicken broth\" with no odor. The bag was changed on 5/10 at his last infusion, but the odor persists.     Smita stated it flushes easily. She currently flushed once a day. Output is only 12-13 ml daily, which is including the 10 ml flush. This output has been consistent over the last two weeks.     Slick denies fever, chills, warmth or redness around the insertion site. He is otherwise doing \"okay.\" He continues to have side effects from chemo which are being addressed by his medical team. Recent labs are WNL.     When asked about the plan for his liver biopsy, Smita stated Slick is scheduled for a liver US tomorrow. No plans for a biopsy at this time that she is aware of.     Message sent to Dr. Farooq and Mago Heath PA-C for recommendations regarding the drain.     Update:  Spoke with Smita and reviewed Dr. Farooq's recommendations including a visit with a provider within the next 24-48 hours to assess in person locally. Slick has an appointment with his PCP on 5/17.     Spoke with Dr. Wale Taveras's nurse. Darcy will relay info regarding changes in drain to Dr. Echevarria and call us back with their plan. She confirmed the liver US is in preparation for a possible liver biopsy. She relayed that Slick seems confused about what these biopsy results may mean in regards to his surgery. Reassured her once the biopsy is scheduled, Slick can be scheduled for a follow-up visit with Dr. Farooq to discuss.        Lizbeth Christy RNCC  [facility]   Surgical Oncology         "

## 2023-05-21 ENCOUNTER — HEALTH MAINTENANCE LETTER (OUTPATIENT)
Age: 58
End: 2023-05-21

## 2023-06-05 ENCOUNTER — VIRTUAL VISIT (OUTPATIENT)
Dept: SURGERY | Facility: CLINIC | Age: 58
End: 2023-06-05
Attending: SURGERY
Payer: COMMERCIAL

## 2023-06-05 ENCOUNTER — PATIENT OUTREACH (OUTPATIENT)
Dept: SURGERY | Facility: CLINIC | Age: 58
End: 2023-06-05
Payer: COMMERCIAL

## 2023-06-05 VITALS — BODY MASS INDEX: 27.92 KG/M2 | HEIGHT: 70 IN | WEIGHT: 195 LBS

## 2023-06-05 DIAGNOSIS — C25.3 ADENOCARCINOMA OF PANCREATIC DUCT (H): Primary | ICD-10-CM

## 2023-06-05 DIAGNOSIS — T85.79XD INFECTION OF CHOLECYSTOSTOMY DRAIN, SUBSEQUENT ENCOUNTER: ICD-10-CM

## 2023-06-05 DIAGNOSIS — C78.7 METASTASIS TO LIVER (H): ICD-10-CM

## 2023-06-05 PROCEDURE — 99213 OFFICE O/P EST LOW 20 MIN: CPT | Mod: VID | Performed by: SURGERY

## 2023-06-05 ASSESSMENT — PAIN SCALES - GENERAL: PAINLEVEL: NO PAIN (0)

## 2023-06-05 NOTE — PROGRESS NOTES
Surgical Oncology RN Care Coordination Note:     Called and spoke with patient about scheduling a virtual visit with Dr. Farooq today to discuss follow up plan now that liver biopsy is complete. He agreed with plan and will proceed with phone visit today at 2 pm.     Natty Uribe RN, BSN  Care Coordinator

## 2023-06-05 NOTE — PROGRESS NOTES
Virtual Visit Details    Type of service:  Video Visit     Originating Location (pt. Location): Home    Distant Location (provider location):  On-site  Platform used for Video Visit: [employer]   2pm-214pm

## 2023-06-05 NOTE — PROGRESS NOTES
"  Department of Surgery  Division of Surgical Oncology    Follow-Up  Jun 5, 2023    Slick Lizarraga is a 57 year old male who is being followed for PDAC, with perc demetri tube in place.  He is joined today by his wife.    History of Present Illness   Slick is doing well. He is maintaining his weight. He is tolerating chemo - next cycle (5) starting tomorrow.    His drain continues to have low output. The volume is low, 5-7mls daily, and the drain is flushing fine. The output is very tan, cloudy, and foul-smelling. No fever or chills.      Physical Exam     Ht 1.778 m (5' 10\")   Wt 88.5 kg (195 lb)   BMI 27.98 kg/m       Results Reviewed:  I reviewed his most recent CA 19-9, CBC, and CMP, as well as his pathology result from 6/1/23    I, Gray Farooq, personally reviewed the above studies.      Assessment & Plan     Slick Lizarraga is a 57 year old male being followed for a perc demetri tube in the setting of PDAC.    Unfortunately, Slick's liver biopsy showed metastatic adenocarcinoma. I explained to him and his family that this meant that he had stage 4 pancreas cancer. He will have an opportunity to meet with his oncologist to discuss these results but I had an honest and transparent discussion with them that this meant that he was no longer a surgical candidate for a Whipple and that chemotherapy would most likely be palliative rather than curative. His CA 19-9 is responding well to his treatment and I encouraged him to continue treatment to prolong his life.    Regarding his perc demetri tube, the output does appear to be consistent with an abscess. I asked him and his wife to alter their flushing protocol to:  -Flush with 10mls NS daily  -Do NOT draw back/aspirate after flushing  -Ok to flush the downstream tubing ('clearing' the tubing in the direction of the bag) with 10ml (or more) of NS    I recommend a CT scan at the next convenient interval to evaluate the location of this drain and any possible fluid around it. I " think it is ok to continue with C5 of chemo but his medical oncologist could consider repeating a CT after C5 rather than the usual C6. It would be nice to be able to remove this drain to improve his quality of life, but I would not do that without seeing a CT first.    All questions were answered to their apparent satisfaction, and contact information was provided should additional questions or concerns arise.        Gray Farooq MD MPHS      Total time spent was 20 minutes, including but not limited to patient-facing time, chart review, review of tests/studies as noted above, and care coordination.

## 2023-06-05 NOTE — LETTER
"    6/5/2023         RE: Slick Lizarraga  [address] [facility] [address]        Dear Colleague,    Thank you for referring your patient, Slick Lizarraga, to the [facility]. Please see a copy of my visit note below.    Virtual Visit Details    Type of service:  Video Visit     Originating Location (pt. Location): Home    Distant Location (provider location):  On-site  Platform used for Video Visit: [employer]   2pm-214pm      Department of Surgery  Division of Surgical Oncology    Follow-Up  Jun 5, 2023    Slick Lizarraga is a 57 year old male who is being followed for PDAC, with perc demetri tube in place.  He is joined today by his wife.    History of Present Illness   Slick is doing well. He is maintaining his weight. He is tolerating chemo - next cycle (5) starting tomorrow.    His drain continues to have low output. The volume is low, 5-7mls daily, and the drain is flushing fine. The output is very tan, cloudy, and foul-smelling. No fever or chills.      Physical Exam     Ht 1.778 m (5' 10\")   Wt 88.5 kg (195 lb)   BMI 27.98 kg/m       Results Reviewed:  I reviewed his most recent CA 19-9, CBC, and CMP, as well as his pathology result from 6/1/23    I, Gray Farooq, personally reviewed the above studies.  Assessment & Plan     Slick Lizarraga is a 57 year old male being followed for a perc demetri tube in the setting of PDAC.    Unfortunately, Slick's liver biopsy showed metastatic adenocarcinoma. I explained to him and his family that this meant that he had stage 4 pancreas cancer. He will have an opportunity to meet with his oncologist to discuss these results but I had an honest and transparent discussion with them that this meant that he was no longer a surgical candidate for a Whipple and that chemotherapy would most likely be palliative rather than curative. His CA 19-9 is responding well to his treatment and I encouraged him to continue treatment to prolong his life.    Regarding his perc " demetri tube, the output does appear to be consistent with an abscess. I asked him and his wife to alter their flushing protocol to:  -Flush with 10mls NS daily  -Do NOT draw back/aspirate after flushing  -Ok to flush the downstream tubing ('clearing' the tubing in the direction of the bag) with 10ml (or more) of NS    I recommend a CT scan at the next convenient interval to evaluate the location of this drain and any possible fluid around it. I think it is ok to continue with C5 of chemo but his medical oncologist could consider repeating a CT after C5 rather than the usual C6. It would be nice to be able to remove this drain to improve his quality of life, but I would not do that without seeing a CT first.    All questions were answered to their apparent satisfaction, and contact information was provided should additional questions or concerns arise.    Gray Farooq MD MPHS    Total time spent was 20 minutes, including but not limited to patient-facing time, chart review, review of tests/studies as noted above, and care coordination.

## 2023-08-13 ENCOUNTER — HEALTH MAINTENANCE LETTER (OUTPATIENT)
Age: 58
End: 2023-08-13

## 2023-12-31 ENCOUNTER — HEALTH MAINTENANCE LETTER (OUTPATIENT)
Age: 58
End: 2023-12-31

## 2024-05-19 ENCOUNTER — HEALTH MAINTENANCE LETTER (OUTPATIENT)
Age: 59
End: 2024-05-19

## 2024-07-28 ENCOUNTER — HEALTH MAINTENANCE LETTER (OUTPATIENT)
Age: 59
End: 2024-07-28

## 2024-10-06 ENCOUNTER — HEALTH MAINTENANCE LETTER (OUTPATIENT)
Age: 59
End: 2024-10-06

## 2025-01-19 ENCOUNTER — HEALTH MAINTENANCE LETTER (OUTPATIENT)
Age: 60
End: 2025-01-19

## 2025-04-27 ENCOUNTER — HEALTH MAINTENANCE LETTER (OUTPATIENT)
Age: 60
End: 2025-04-27

## 2025-08-10 ENCOUNTER — HEALTH MAINTENANCE LETTER (OUTPATIENT)
Age: 60
End: 2025-08-10

## (undated) DEVICE — PACK ENDOSCOPY GI CUSTOM UMMC

## (undated) DEVICE — GUIDEWIRE NOVAGOLD .018X260CM STR TIP M00552000

## (undated) DEVICE — KIT CONNECTOR FOR OLYMPUS ENDOSCOPES DEFENDO 100310

## (undated) DEVICE — Device

## (undated) DEVICE — ENDO BITE BLOCK ADULT OMNI-BLOC

## (undated) DEVICE — ENDO TUBING CO2 SMARTCAP STERILE DISP 100145CO2EXT

## (undated) DEVICE — BIOPSY VALVE BIOSHIELD 00711135

## (undated) DEVICE — INFLATION DEVICE BIG 60 ENDO-AN6012

## (undated) DEVICE — KIT ENDO FIRST STEP DISINFECTANT 200ML W/POUCH EP-4

## (undated) DEVICE — SOL WATER IRRIG 1000ML BOTTLE 2F7114

## (undated) DEVICE — WIRE GUIDE 0.025"X270CM ANG VISIGLIDE G-240-2527A

## (undated) DEVICE — ENDO FUSION OMNI-TOME 21 FS-OMNI-21 G48675

## (undated) DEVICE — ENDO DEVICE LOCKING AND BIOPSY CAP M00545261

## (undated) DEVICE — ENDO CATH BALLOON DILATION HURRICANE 08MMX4X180CM M00545940

## (undated) DEVICE — SUCTION MANIFOLD NEPTUNE 2 SYS 4 PORT 0702-020-000

## (undated) DEVICE — ESU GROUND PAD ADULT W/CORD E7507

## (undated) DEVICE — ENDO FUSION OMNI-TOME G31903

## (undated) DEVICE — CATH RETRIEVAL BALLOON EXTRACTOR PRO RX-S INJ ABOVE 9-12MM

## (undated) DEVICE — WIRE GUIDE TRACER METRO DIRECT .021"X260CM STR TIP G55705

## (undated) DEVICE — GLOVE BIOGEL PI ULTRATOUCH SZ 8.0 41180

## (undated) RX ORDER — FENTANYL CITRATE 50 UG/ML
INJECTION, SOLUTION INTRAMUSCULAR; INTRAVENOUS
Status: DISPENSED
Start: 2023-03-14

## (undated) RX ORDER — FENTANYL CITRATE-0.9 % NACL/PF 10 MCG/ML
PLASTIC BAG, INJECTION (ML) INTRAVENOUS
Status: DISPENSED
Start: 2023-03-21

## (undated) RX ORDER — INDOMETHACIN 50 MG/1
SUPPOSITORY RECTAL
Status: DISPENSED
Start: 2023-03-14

## (undated) RX ORDER — LIDOCAINE HYDROCHLORIDE 10 MG/ML
INJECTION, SOLUTION EPIDURAL; INFILTRATION; INTRACAUDAL; PERINEURAL
Status: DISPENSED
Start: 2023-03-23

## (undated) RX ORDER — FENTANYL CITRATE 50 UG/ML
INJECTION, SOLUTION INTRAMUSCULAR; INTRAVENOUS
Status: DISPENSED
Start: 2023-03-23

## (undated) RX ORDER — FENTANYL CITRATE 50 UG/ML
INJECTION, SOLUTION INTRAMUSCULAR; INTRAVENOUS
Status: DISPENSED
Start: 2023-03-21

## (undated) RX ORDER — HYDRALAZINE HYDROCHLORIDE 20 MG/ML
INJECTION INTRAMUSCULAR; INTRAVENOUS
Status: DISPENSED
Start: 2023-03-14

## (undated) RX ORDER — HYDROMORPHONE HYDROCHLORIDE 1 MG/ML
INJECTION, SOLUTION INTRAMUSCULAR; INTRAVENOUS; SUBCUTANEOUS
Status: DISPENSED
Start: 2023-03-14

## (undated) RX ORDER — SIMETHICONE 40MG/0.6ML
SUSPENSION, DROPS(FINAL DOSAGE FORM)(ML) ORAL
Status: DISPENSED
Start: 2023-03-14

## (undated) RX ORDER — IOPAMIDOL 510 MG/ML
INJECTION, SOLUTION INTRAVASCULAR
Status: DISPENSED
Start: 2023-03-14

## (undated) RX ORDER — OXYCODONE HYDROCHLORIDE 5 MG/1
TABLET ORAL
Status: DISPENSED
Start: 2023-03-14

## (undated) RX ORDER — SODIUM CHLORIDE, SODIUM LACTATE, POTASSIUM CHLORIDE, CALCIUM CHLORIDE 600; 310; 30; 20 MG/100ML; MG/100ML; MG/100ML; MG/100ML
INJECTION, SOLUTION INTRAVENOUS
Status: DISPENSED
Start: 2023-03-14

## (undated) RX ORDER — METOPROLOL TARTRATE 1 MG/ML
INJECTION, SOLUTION INTRAVENOUS
Status: DISPENSED
Start: 2023-03-14

## (undated) RX ORDER — HYDROMORPHONE HYDROCHLORIDE 1 MG/ML
INJECTION, SOLUTION INTRAMUSCULAR; INTRAVENOUS; SUBCUTANEOUS
Status: DISPENSED
Start: 2023-03-21